# Patient Record
Sex: MALE | Race: WHITE | Employment: FULL TIME | URBAN - METROPOLITAN AREA
[De-identification: names, ages, dates, MRNs, and addresses within clinical notes are randomized per-mention and may not be internally consistent; named-entity substitution may affect disease eponyms.]

---

## 2019-01-30 ENCOUNTER — HISTORICAL (OUTPATIENT)
Dept: RADIOLOGY | Facility: HOSPITAL | Age: 29
End: 2019-01-30

## 2019-01-30 LAB
ALBUMIN SERPL-MCNC: 4.7 GM/DL (ref 3.4–5)
ALBUMIN/GLOB SERPL: 1.3 RATIO (ref 1.1–2)
ALP SERPL-CCNC: 71 UNIT/L (ref 45–117)
ALT SERPL-CCNC: 14 UNIT/L (ref 12–78)
AST SERPL-CCNC: 22 UNIT/L (ref 15–37)
BILIRUB SERPL-MCNC: 0.4 MG/DL (ref 0.2–1)
BILIRUBIN DIRECT+TOT PNL SERPL-MCNC: 0.1 MG/DL
BILIRUBIN DIRECT+TOT PNL SERPL-MCNC: 0.3 MG/DL
BUN SERPL-MCNC: 14 MG/DL (ref 7–18)
CALCIUM SERPL-MCNC: 9.2 MG/DL (ref 8.5–10.1)
CHLORIDE SERPL-SCNC: 105 MMOL/L (ref 98–107)
CHOLEST SERPL-MCNC: 167 MG/DL
CHOLEST/HDLC SERPL: 3.2 {RATIO} (ref 0–5)
CO2 SERPL-SCNC: 29 MMOL/L (ref 21–32)
CORTIS SERPL-SCNC: 15 MCG/DL
CREAT SERPL-MCNC: 1.1 MG/DL (ref 0.6–1.3)
GLOBULIN SER-MCNC: 3.7 GM/ML (ref 2.3–3.5)
GLUCOSE SERPL-MCNC: 99 MG/DL (ref 74–106)
HDLC SERPL-MCNC: 52 MG/DL
LDLC SERPL CALC-MCNC: 101 MG/DL (ref 0–130)
POTASSIUM SERPL-SCNC: 4.2 MMOL/L (ref 3.5–5.1)
PROT SERPL-MCNC: 8.4 GM/DL (ref 6.4–8.2)
SODIUM SERPL-SCNC: 137 MMOL/L (ref 136–145)
TRIGL SERPL-MCNC: 72 MG/DL
TSH SERPL-ACNC: 1.12 MIU/L (ref 0.36–3.74)
VLDLC SERPL CALC-MCNC: 14 MG/DL

## 2019-03-20 ENCOUNTER — HISTORICAL (OUTPATIENT)
Dept: ADMINISTRATIVE | Facility: HOSPITAL | Age: 29
End: 2019-03-20

## 2019-03-20 LAB
T4 FREE SERPL-MCNC: 0.87 NG/DL (ref 0.76–1.46)
TSH SERPL-ACNC: 1.03 MIU/L (ref 0.36–3.74)

## 2019-03-22 ENCOUNTER — HISTORICAL (OUTPATIENT)
Dept: FAMILY MEDICINE | Facility: CLINIC | Age: 29
End: 2019-03-22

## 2019-04-02 ENCOUNTER — HISTORICAL (OUTPATIENT)
Dept: RADIOLOGY | Facility: HOSPITAL | Age: 29
End: 2019-04-02

## 2020-06-10 ENCOUNTER — HISTORICAL (OUTPATIENT)
Dept: ADMINISTRATIVE | Facility: HOSPITAL | Age: 30
End: 2020-06-10

## 2020-06-10 LAB
ABS NEUT (OLG): 7.76 X10(3)/MCL (ref 2.1–9.2)
APPEARANCE, UA: NORMAL
BACTERIA #/AREA URNS AUTO: ABNORMAL /HPF
BASOPHILS # BLD AUTO: 0.1 X10(3)/MCL (ref 0–0.2)
BASOPHILS NFR BLD AUTO: 1 %
BILIRUB UR QL STRIP: NEGATIVE
COLOR UR: YELLOW
EOSINOPHIL # BLD AUTO: 0.5 X10(3)/MCL (ref 0–0.9)
EOSINOPHIL NFR BLD AUTO: 4 %
ERYTHROCYTE [DISTWIDTH] IN BLOOD BY AUTOMATED COUNT: 13.1 % (ref 11.5–14.5)
GLUCOSE (UA): NEGATIVE
HCT VFR BLD AUTO: 46.5 % (ref 40–51)
HGB BLD-MCNC: 14.8 GM/DL (ref 13.5–17.5)
HGB UR QL STRIP: NEGATIVE
HIV 1+2 AB+HIV1 P24 AG SERPL QL IA: NONREACTIVE
HYALINE CASTS #/AREA URNS LPF: ABNORMAL /LPF
IMM GRANULOCYTES # BLD AUTO: 0.03 10*3/UL
IMM GRANULOCYTES NFR BLD AUTO: 0 %
KETONES UR QL STRIP: NEGATIVE
LEUKOCYTE ESTERASE UR QL STRIP: NEGATIVE
LYMPHOCYTES # BLD AUTO: 2.4 X10(3)/MCL (ref 0.6–4.6)
LYMPHOCYTES NFR BLD AUTO: 20 %
MCH RBC QN AUTO: 29.7 PG (ref 26–34)
MCHC RBC AUTO-ENTMCNC: 31.8 GM/DL (ref 31–37)
MCV RBC AUTO: 93.4 FL (ref 80–100)
MONOCYTES # BLD AUTO: 1.1 X10(3)/MCL (ref 0.1–1.3)
MONOCYTES NFR BLD AUTO: 9 %
NEUTROPHILS # BLD AUTO: 7.76 X10(3)/MCL (ref 2.1–9.2)
NEUTROPHILS NFR BLD AUTO: 65 %
NITRITE UR QL STRIP: NEGATIVE
PH UR STRIP: 7 [PH] (ref 4.5–8)
PLATELET # BLD AUTO: 298 X10(3)/MCL (ref 130–400)
PMV BLD AUTO: 10.2 FL (ref 7.4–10.4)
PROT UR QL STRIP: NEGATIVE
RBC # BLD AUTO: 4.98 X10(6)/MCL (ref 4.5–5.9)
RBC #/AREA URNS AUTO: ABNORMAL /HPF
SP GR UR STRIP: 1.01 (ref 1–1.03)
SQUAMOUS #/AREA URNS LPF: ABNORMAL /LPF
T4 FREE SERPL-MCNC: 0.99 NG/DL (ref 0.76–1.46)
TSH SERPL-ACNC: 1.7 MIU/L (ref 0.36–3.74)
UROBILINOGEN UR STRIP-ACNC: NORMAL
WBC # SPEC AUTO: 11.9 X10(3)/MCL (ref 4.5–11)
WBC #/AREA URNS AUTO: ABNORMAL /HPF

## 2021-08-05 ENCOUNTER — HISTORICAL (OUTPATIENT)
Dept: ADMINISTRATIVE | Facility: HOSPITAL | Age: 31
End: 2021-08-05

## 2021-08-05 LAB
ABS NEUT (OLG): 3.32 X10(3)/MCL (ref 2.1–9.2)
ALBUMIN SERPL-MCNC: 4.7 GM/DL (ref 3.5–5)
ALBUMIN/GLOB SERPL: 1.4 RATIO (ref 1.1–2)
ALP SERPL-CCNC: 76 UNIT/L (ref 40–150)
ALT SERPL-CCNC: 12 UNIT/L (ref 0–55)
AST SERPL-CCNC: 17 UNIT/L (ref 5–34)
BASOPHILS # BLD AUTO: 0.1 X10(3)/MCL (ref 0–0.2)
BASOPHILS NFR BLD AUTO: 1 %
BILIRUB SERPL-MCNC: 0.4 MG/DL
BILIRUBIN DIRECT+TOT PNL SERPL-MCNC: 0.2 MG/DL (ref 0–0.5)
BILIRUBIN DIRECT+TOT PNL SERPL-MCNC: 0.2 MG/DL (ref 0–0.8)
BUN SERPL-MCNC: 14.3 MG/DL (ref 8.9–20.6)
CALCIUM SERPL-MCNC: 10.3 MG/DL (ref 8.4–10.2)
CHLORIDE SERPL-SCNC: 103 MMOL/L (ref 98–107)
CO2 SERPL-SCNC: 32 MMOL/L (ref 22–29)
CREAT SERPL-MCNC: 0.97 MG/DL (ref 0.73–1.18)
EOSINOPHIL # BLD AUTO: 0.2 X10(3)/MCL (ref 0–0.9)
EOSINOPHIL NFR BLD AUTO: 3 %
ERYTHROCYTE [DISTWIDTH] IN BLOOD BY AUTOMATED COUNT: 12.7 % (ref 11.5–14.5)
EST CREAT CLEARANCE SER (OHS): 119.19 ML/MIN
GLOBULIN SER-MCNC: 3.3 GM/DL (ref 2.4–3.5)
GLUCOSE SERPL-MCNC: 96 MG/DL (ref 74–100)
HCT VFR BLD AUTO: 47.1 % (ref 40–51)
HGB BLD-MCNC: 15.2 GM/DL (ref 13.5–17.5)
HIV 1+2 AB+HIV1 P24 AG SERPL QL IA: NONREACTIVE
IMM GRANULOCYTES # BLD AUTO: 0.02 10*3/UL
IMM GRANULOCYTES NFR BLD AUTO: 0 %
LYMPHOCYTES # BLD AUTO: 1.9 X10(3)/MCL (ref 0.6–4.6)
LYMPHOCYTES NFR BLD AUTO: 30 %
MCH RBC QN AUTO: 29.7 PG (ref 26–34)
MCHC RBC AUTO-ENTMCNC: 32.3 GM/DL (ref 31–37)
MCV RBC AUTO: 92.2 FL (ref 80–100)
MONOCYTES # BLD AUTO: 0.8 X10(3)/MCL (ref 0.1–1.3)
MONOCYTES NFR BLD AUTO: 13 %
NEUTROPHILS # BLD AUTO: 3.32 X10(3)/MCL (ref 2.1–9.2)
NEUTROPHILS NFR BLD AUTO: 53 %
NRBC BLD AUTO-RTO: 0 % (ref 0–0.2)
PLATELET # BLD AUTO: 285 X10(3)/MCL (ref 130–400)
PMV BLD AUTO: 10.4 FL (ref 7.4–10.4)
POTASSIUM SERPL-SCNC: 4.2 MMOL/L (ref 3.5–5.1)
PROT SERPL-MCNC: 8 GM/DL (ref 6.4–8.3)
RBC # BLD AUTO: 5.11 X10(6)/MCL (ref 4.5–5.9)
SODIUM SERPL-SCNC: 141 MMOL/L (ref 136–145)
T PALLIDUM AB SER QL: NONREACTIVE
WBC # SPEC AUTO: 6.3 X10(3)/MCL (ref 4.5–11)

## 2022-04-10 ENCOUNTER — HISTORICAL (OUTPATIENT)
Dept: ADMINISTRATIVE | Facility: HOSPITAL | Age: 32
End: 2022-04-10
Payer: MEDICAID

## 2022-04-27 VITALS
HEIGHT: 71 IN | SYSTOLIC BLOOD PRESSURE: 133 MMHG | OXYGEN SATURATION: 100 % | BODY MASS INDEX: 17.38 KG/M2 | WEIGHT: 124.13 LBS | DIASTOLIC BLOOD PRESSURE: 82 MMHG
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
## 2022-05-03 NOTE — HISTORICAL [facility]
This is a historical note converted from Yvette. Formatting and pictures may have been removed.  Please reference Yvette for original formatting and attached multimedia. Chief Complaint  follow-up / warts on hands x 5-6 months  History of Present Illness  29 yo M with h/o HTN and?previous?kidney stone (2015) presents to the clinic for routine follow up. Today, he complains of warts on both hands.? Patient states he had warts when he was about 12 years old?and had them removed with cryo.? This current flare started approximately 3-4 months ago and has worsened. ?Warts are present in both hands and have not responded to home remedies.? They are not painful?and he denies warts anywhere else.? He is also concerned about an erythematous patch on the palm of the right hand?which he suspected was a blister?is now unsure.  ?   HTN is well controlled?on amlodipine and metoprolol. ?He reports compliance and no side effects.? He continues to have rare?R flank pain. ?Previous renal artery ultrasound incidentally noted a retained?renal stone.  Review of Systems  11 point ROS reviewed and negative except as noted above  Physical Exam  Vitals & Measurements  T:?36.7? ?C (Oral)? HR:?75(Peripheral)? RR:?20? BP:?138/74?  HT:?180.34?cm? WT:?58.6?kg? BMI:?18.02?  General:?AAO x 3, no acute distress, well appearing  Eye: PERRLA, EOMI, clear conjunctiva, eyelids normal  HENT:?oropharynx without erythema/exudate, oropharynx and nasal mucosal surfaces moist  Respiratory:?clear to auscultation bilaterally, normal respiratory?rate and inspiratory effort  Cardiovascular:?RRR w/o murmurs, gallops or rubs, normal peripheral pulses, no LE edema  Integumentary: at least 10 small warts noted in even distribution of both hands predominantly on pads of fingers but also knuckles, no central dimple, no erythema, there is a 1.2 cm raised/thickened erythematous patch on the palm of the R hand  Neurologic: moves all extremities spontaneously, speech is  clear, cognition in tact  Assessment/Plan  B/L Hand Warts  Palm lesion  -made referral to Derm clinic, but given long wait times recommended OTC wart treatments including salicylic acid or home cryo kits  -checking HIV and immunoglobulin levels given abnormal eruption of warts in adult  -For the lesion on the palm, recommended OTC antifungal lotion for?1 korin, if not improved?then 2 wk trial of hydrocortisone cream  ?   HTN  -Reviewed renal artery US, normal. Reviewed?metanephrines, cortisol, etc.?- all WNL  -Renin/aldosterone ordered  -BP well-controlled today  -Counseled on low-sodium diet  -Continue amlodipine 5 mg metoprolol succinate 50 mg daily  ?   Anxiety  -improved after getting normal results of previous workups  -tremor improved w/metoprolol  -no further treatment indicated  ?   h/o kidney stone  -Again counseled on proper hydration, proper diet,?and symptoms to look out for  -Discussed treatment options, referral to Urology, etc. Pt prefers to monitor at this time. Will continue with checking renal fx q 6 months and UA for hematuria  ?   Tobacco use  -Counseled extensively on cessation  ?   Health?maintenance  -Flu vaccine given today  ?  ?   Return to clinic in 6 months   Problem List/Past Medical History  Ongoing  Essential hypertension  Historical  No qualifying data  Procedure/Surgical History  hernia repair   Medications  amlodipine 5 mg oral tablet, 5 mg= 1 tab(s), Oral, Daily, 2 refills  metoprolol succinate 50 mg oral capsule, extended release, 50 mg= 1 cap(s), Oral, Daily, 2 refills  Allergies  No Known Allergies  No Known Medication Allergies  Social History  Abuse/Neglect  No, 01/27/2020  No, No, Yes, 12/01/2019  Alcohol - Medium Risk, 01/19/2015  Past, Wine, 1-2 times per week, Alcohol use interferes with work or home: No. Drinks more than intended: Yes. Others hurt by drinking: No. Ready to change: No. Household alcohol concerns: No., 03/20/2019  Home/Environment  Lives with Significant  other. Living situation: Home/Independent. Home equipment: Monitoring. Alcohol abuse in household: No. Substance abuse in household: No. Smoker in household: Yes. Feels unsafe at home: No. Safe place to go: Yes. Family/Friends available for support: Yes. Concern for family members at home: No. Major illness in household: No. Financial concerns: No., 03/20/2019  Nutrition/Health  Regular, Caffeine intake amount: 1 cup coffee daily. Wants to lose weight: No. Sleeping concerns: No. Feels highly stressed: No., 03/20/2019  Sexual  Gender Identity Identifies as male., 02/21/2019  Substance Use - Low Risk, 02/05/2016  Current, Marijuana, 1-2 times per week, 12/11/2018  Tobacco - Medium Risk, 01/19/2015  10 or more cigarettes (1/2 pack or more)/day in last 30 days, No, 01/27/2020  5-9 cigarettes (between 1/4 to 1/2 pack)/day in last 30 days, No, 12/01/2019  10 or more cigarettes (1/2 pack or more)/day in last 30 days, Cigarettes, No, 09/30/2019  Family History  Hypertension.: Mother and Father.  Immunizations  Vaccine Date Status   influenza virus vaccine, inactivated 09/30/2019 Given   Health Maintenance  Health Maintenance  ???Pending?(in the next year)  ??? ??OverDue  ??? ? ? ?Alcohol Misuse Screening due??01/01/20??and every 1??year(s)  ??? ? ? ?Smoking Cessation due??01/01/20??and every 1??year(s)  ??? ??Due?  ??? ? ? ?Hypertension Management-Education due??06/10/20??and every 1??year(s)  ??? ? ? ?Tetanus Vaccine due??06/10/20??and every 10??year(s)  ??? ??Due In Future?  ??? ? ? ?Blood Pressure Screening not due until??09/29/20??and every 1??year(s)  ??? ? ? ?Depression Screening not due until??09/29/20??and every 1??year(s)  ??? ? ? ?Hypertension Management-Blood Pressure not due until??09/29/20??and every 1??year(s)  ??? ? ? ?ADL Screening not due until??09/30/20??and every 1??year(s)  ??? ? ? ?Hypertension Management-BMP not due until??11/30/20??and every 1??year(s)  ??? ? ? ?Obesity Screening not due  until??01/01/21??and every 1??year(s)  ??? ? ? ?Body Mass Index Check not due until??01/26/21??and every 1??year(s)  ???Satisfied?(in the past 1 year)  ??? ??Satisfied?  ??? ? ? ?ADL Screening on??09/30/19.??Satisfied by Analilia Ramirez LPN  ??? ? ? ?Blood Pressure Screening on??01/27/20.??Satisfied by Shiva Linares RN  ??? ? ? ?Body Mass Index Check on??01/27/20.??Satisfied by Shiva Linares RN  ??? ? ? ?Depression Screening on??09/30/19.??Satisfied by Analilia Ramirez LPN  ??? ? ? ?Hypertension Management-Blood Pressure on??01/27/20.??Satisfied by Shiva Linares RN  ??? ? ? ?Influenza Vaccine on??09/30/19.??Satisfied by Analilia Ramirez LPN  ??? ? ? ?Obesity Screening on??01/27/20.??Satisfied by Shiva Linares RN  ?      Patient seen and examined,?reviewed with the resident,?agree with?assessment?and?plan. spoke to outside pa with dermatology. recs trying antifungal cream for 2-4 weeks on hand lesion and then corticosteroid if not better vs bs/scapint.? as for warts, standart therapy vs cryotherapy.? will also send off renin and renein / aldosterone levels

## 2022-05-03 NOTE — HISTORICAL [facility]
This is a historical note converted from Yvette. Formatting and pictures may have been removed.  Please reference Yvette for original formatting and attached multimedia. Chief Complaint  follow up with medicaiton refill, possible STD  History of Present Illness  Mr. Garza is a pleasant 31-year-old male with PMH of?hypertension, anxiety,?and Hx of?kidney stone?(2015)?who presents to the clinic for routine follow-up?and with complaint of?new onset genital warts.? Patient states that the warts on both of his hands?have completely gone?with the use of his OTC?Compound W.? Patient states his?ex-girlfriend recently tested positive for HPV.? Patient states he had?no symptoms?however?noticed?multiple warts pop up over the course of the last year. ?Patient states there are 3 nonpainful, nonpruritic, nonindurated warts?at the base of the shaft of the penis.? Patient denies any dysuria, hematuria, polyuria, fevers, chills. ?Patient admits to using condoms?with his previous partners.? Patient has no other complaints at this time.  Review of Systems  An 11 point Review of System was performed and is negative unless documented above.?  Physical Exam  Vitals & Measurements  T:?36.8? ?C (Oral)? HR:?66(Peripheral)? RR:?18? BP:?133/82?  HT:?180.00?cm? WT:?56.300?kg? BMI:?17.38?  General:?Patient sitting comfortably?in chair,?in no acute distress  Eye:?EOMI, PERRL,?clear sclera  HENT:?Normocephalic, atraumatic, grossly normal hearing, moist mucous membranes  Neck:?Trachea midline, no gross thyromegaly  Respiratory:?Clear to auscultation bilaterally, no?rhonchi, no rales, no wheezes  Cardiovascular:?RRR,?no gallops, no murmurs, no rubs  Gastrointestinal:?Abdomen soft, nontender to palpation, no gross organomegaly  Genitourinary: Grossly normal appearing?penis,?no discharge,?circumcised,?no testes enlargement,?3?warts noted at base of shaft, nonindurated,?no surrounding erythema, nonpainful  Lymphatics:?No extremity edema, pulses 2+  in all 4 extremities  Integumentary:?No warts noted on hands,?appears to have resolved,?warts noted?as above  Neurologic:?Moves all extremities spontaneously, speech is clear,?normal affect  Assessment/Plan  1. ?Genital warts  -Syphilis?(-),?HIV (-)  -Pending gonorrhea,?chlamydia  -Currently no symptoms?likely HPV, will monitor  ?  2.? HTN  -Normal?renal artery ultrasound, metanephrines, cortisol, renin/aldosterone ratio  -BP is well controlled today, educated on DASH diet  -Continue?amlodipine 5 mg?and metoprolol succinate 50 mg daily  ?  3.? Anxiety  -Improved as well as tremor?with metoprolol use  -Patient considering?behavioral health?consult  ?  4.? Hx of?bilateral hand warts  -Warts on hands have resolved?with use of?Compound W?(OTC)  -Holding dermatology consult for now, consider if?lesions return  ?  5.? Hx of kidney stone  -Counseled on proper hydration, proper diet, patient aware of symptoms to look out for  -Holding urology consult right now, consider if symptoms return, patient monitoring at home currently  ?  6.? Tobacco use  -Educated extensively on cessation, no interest in quitting at this time  -Currently at 1 pack/day  ?  Health Maintenance  Vaccinations  -Flu:?Declined  -Covid:?Declined  -Tdap:?Declined  Screening  -Colon Ca. Screening:?Family history with grandmother diagnosed at age 70, begin?screening at age 42  ?   Follow-up in ~6 months  ?   Attending Physician Addendum  Management and plan discussed with resident?at time of clinic visit. Care was reasonable and necessary. Routine follow up.  Referrals  Clinic Follow up, *Est. 02/05/22 3:00:00 CST, Order for future visit, Genital warts  Anxiety  Essential hypertension, [facility]   Problem List/Past Medical History  Ongoing  Essential hypertension  Kidney stone on left side  Historical  No qualifying data  Procedure/Surgical History  hernia repair   Medications  amlodipine 5 mg oral tablet, 5 mg= 1 tab(s), Oral, Daily, 2  refills  metoprolol succinate 50 mg oral capsule, extended release, 50 mg= 1 cap(s), Oral, Daily, 2 refills  Allergies  No Known Allergies  No Known Medication Allergies  Social History  Abuse/Neglect  No, No, Yes, 06/10/2020  Alcohol - Medium Risk, 01/19/2015  Past, Wine, 1-2 times per week, Alcohol use interferes with work or home: No. Drinks more than intended: Yes. Others hurt by drinking: No. Ready to change: No. Household alcohol concerns: No., 03/20/2019  Home/Environment  Lives with Significant other. Living situation: Home/Independent. Home equipment: Monitoring. Alcohol abuse in household: No. Substance abuse in household: No. Smoker in household: Yes. Feels unsafe at home: No. Safe place to go: Yes. Family/Friends available for support: Yes. Concern for family members at home: No. Major illness in household: No. Financial concerns: No., 03/20/2019  Nutrition/Health  Regular, Caffeine intake amount: 1 cup coffee daily. Wants to lose weight: No. Sleeping concerns: No. Feels highly stressed: No., 03/20/2019  Sexual  Gender Identity Identifies as male., 02/21/2019  Substance Use - Low Risk, 02/05/2016  Current, Marijuana, 1-2 times per week, 12/11/2018  Tobacco - Medium Risk, 01/19/2015  10 or more cigarettes (1/2 pack or more)/day in last 30 days, Cigarettes, No, 08/05/2021  Family History  Hypertension.: Mother and Father.  Immunizations  Vaccine Date Status   influenza virus vaccine, inactivated 09/30/2019 Given   Health Maintenance  Health Maintenance  ???Pending?(in the next year)  ??? ??Refused?  ??? ? ? ?Tetanus Vaccine due??08/05/21??and every 10??year(s)  ??? ??Due In Future?  ??? ? ? ?Depression Screening not due until??09/30/21??and every 1??year(s)  ??? ? ? ?Obesity Screening not due until??01/01/22??and every 1??year(s)  ??? ? ? ?Smoking Cessation not due until??01/01/22??and every 1??year(s)  ??? ? ? ?Alcohol Misuse Screening not due until??01/02/22??and every 1??year(s)  ???Satisfied?(in the  past 1 year)  ??? ??Satisfied?  ??? ? ? ?ADL Screening on??08/05/21.??Satisfied by Arpit Hirsch LPN  ??? ? ? ?Alcohol Misuse Screening on??08/05/21.??Satisfied by Kary Akins DO  ??? ? ? ?Blood Pressure Screening on??08/05/21.??Satisfied by Arpit Hirsch LPN  ??? ? ? ?Body Mass Index Check on??08/05/21.??Satisfied by Arpit Hirsch LPN  ??? ? ? ?Depression Screening on??09/30/20.??Satisfied by Verona Patel  ??? ? ? ?Hypertension Management-BMP on??08/05/21.??Satisfied by James Love Jr.  ??? ? ? ?Obesity Screening on??08/05/21.??Satisfied by Arpit Hirsch LPN  ??? ? ? ?Smoking Cessation on??08/05/21.??Satisfied by Kary Akins DO  ??? ??Refused?  ??? ? ? ?Influenza Vaccine on??08/05/21.??Recorded by Kary Akins DO  ??? ? ? ?Tetanus Vaccine on??08/05/21.??Recorded by Kary Akins DO  ?  Lab Results  Test Name Test Result Date/Time   Sodium Lvl 141 mmol/L 08/05/2021 09:41 CDT   Potassium Lvl 4.2 mmol/L 08/05/2021 09:41 CDT   Chloride 103 mmol/L 08/05/2021 09:41 CDT   CO2 32 mmol/L (High) 08/05/2021 09:41 CDT   Calcium Lvl 10.3 mg/dL (High) 08/05/2021 09:41 CDT   Glucose Lvl 96 mg/dL 08/05/2021 09:41 CDT   BUN 14.3 mg/dL 08/05/2021 09:41 CDT   Creatinine 0.97 mg/dL 08/05/2021 09:41 CDT   eGFR-AA >105 08/05/2021 09:41 CDT   eGFR-ANDRZEJ 97 mL/min/1.73 m2 08/05/2021 09:41 CDT   Bili Total 0.4 mg/dL 08/05/2021 09:41 CDT   Bili Direct 0.2 mg/dL 08/05/2021 09:41 CDT   Bili Indirect 0.20 mg/dL 08/05/2021 09:41 CDT   AST 17 unit/L 08/05/2021 09:41 CDT   ALT 12 unit/L 08/05/2021 09:41 CDT   Alk Phos 76 unit/L 08/05/2021 09:41 CDT   Total Protein 8.0 gm/dL 08/05/2021 09:41 CDT   Albumin Lvl 4.7 gm/dL 08/05/2021 09:41 CDT   Globulin 3.3 gm/dL 08/05/2021 09:41 CDT   A/G Ratio 1.4 ratio 08/05/2021 09:41 CDT   WBC 6.3 x10(3)/mcL 08/05/2021 09:41 CDT   RBC 5.11 x10(6)/mcL 08/05/2021 09:41 CDT   Hgb 15.2 gm/dL 08/05/2021 09:41 CDT   Hct 47.1 % 08/05/2021 09:41 CDT   Platelet 285  x10(3)/mcL 08/05/2021 09:41 CDT   MCV 92.2 fL 08/05/2021 09:41 CDT   MCH 29.7 pg 08/05/2021 09:41 CDT   MCHC 32.3 gm/dL 08/05/2021 09:41 CDT   RDW 12.7 % 08/05/2021 09:41 CDT   MPV 10.4 fL 08/05/2021 09:41 CDT   Abs Neut 3.32 x10(3)/mcL 08/05/2021 09:41 CDT   Neutro Auto 53 % 08/05/2021 09:41 CDT   Lymph Auto 30 % 08/05/2021 09:41 CDT   Mono Auto 13 % 08/05/2021 09:41 CDT   Eos Auto 3 % 08/05/2021 09:41 CDT   Abs Eos 0.2 x10(3)/mcL 08/05/2021 09:41 CDT   Basophil Auto 1 % 08/05/2021 09:41 CDT   Abs Neutro 3.32 x10(3)/mcL 08/05/2021 09:41 CDT   Abs Lymph 1.9 x10(3)/mcL 08/05/2021 09:41 CDT   Abs Mono 0.8 x10(3)/[facility] 08/05/2021 09:41 CDT   Abs Baso 0.1 x10(3)/[facility] 08/05/2021 09:41 CDT   NRBC% 0.0 % 08/05/2021 09:41 CDT   IG% 0 % 08/05/2021 09:41 CDT   IG# 0.020 08/05/2021 09:41 CDT   Syphilis Ab Nonreactive 08/05/2021 09:41 CDT   HIV Nonreactive 08/05/2021 09:41 CDT

## 2022-07-16 ENCOUNTER — HOSPITAL ENCOUNTER (EMERGENCY)
Facility: HOSPITAL | Age: 32
Discharge: HOME | End: 2022-07-16
Attending: FAMILY MEDICINE
Payer: MEDICAID

## 2022-07-16 VITALS
OXYGEN SATURATION: 100 % | RESPIRATION RATE: 15 BRPM | DIASTOLIC BLOOD PRESSURE: 74 MMHG | HEIGHT: 71 IN | SYSTOLIC BLOOD PRESSURE: 113 MMHG | BODY MASS INDEX: 17.34 KG/M2 | TEMPERATURE: 99 F | HEART RATE: 55 BPM | WEIGHT: 123.88 LBS

## 2022-07-16 DIAGNOSIS — U07.1 COVID: Primary | ICD-10-CM

## 2022-07-16 LAB
ALBUMIN SERPL-MCNC: 4.5 GM/DL (ref 3.5–5)
ALBUMIN/GLOB SERPL: 1.4 RATIO (ref 1.1–2)
ALP SERPL-CCNC: 72 UNIT/L (ref 40–150)
ALT SERPL-CCNC: 38 UNIT/L (ref 0–55)
AST SERPL-CCNC: 105 UNIT/L (ref 5–34)
BASOPHILS # BLD AUTO: 0.02 X10(3)/MCL (ref 0–0.2)
BASOPHILS NFR BLD AUTO: 0.4 %
BILIRUBIN DIRECT+TOT PNL SERPL-MCNC: 0.4 MG/DL
BUN SERPL-MCNC: 11.3 MG/DL (ref 8.9–20.6)
CALCIUM SERPL-MCNC: 9.8 MG/DL (ref 8.4–10.2)
CHLORIDE SERPL-SCNC: 103 MMOL/L (ref 98–107)
CO2 SERPL-SCNC: 29 MMOL/L (ref 22–29)
CREAT SERPL-MCNC: 1.15 MG/DL (ref 0.73–1.18)
EOSINOPHIL # BLD AUTO: 0.04 X10(3)/MCL (ref 0–0.9)
EOSINOPHIL NFR BLD AUTO: 0.8 %
ERYTHROCYTE [DISTWIDTH] IN BLOOD BY AUTOMATED COUNT: 12.6 % (ref 11.5–17)
FLUAV AG UPPER RESP QL IA.RAPID: NOT DETECTED
FLUBV AG UPPER RESP QL IA.RAPID: NOT DETECTED
GLOBULIN SER-MCNC: 3.3 GM/DL (ref 2.4–3.5)
GLUCOSE SERPL-MCNC: 87 MG/DL (ref 74–100)
HCT VFR BLD AUTO: 48.6 % (ref 42–52)
HGB BLD-MCNC: 15.7 GM/DL (ref 14–18)
IMM GRANULOCYTES # BLD AUTO: 0.02 X10(3)/MCL (ref 0–0.04)
IMM GRANULOCYTES NFR BLD AUTO: 0.4 %
LYMPHOCYTES # BLD AUTO: 2.1 X10(3)/MCL (ref 0.6–4.6)
LYMPHOCYTES NFR BLD AUTO: 42.4 %
MCH RBC QN AUTO: 29.3 PG (ref 27–31)
MCHC RBC AUTO-ENTMCNC: 32.3 MG/DL (ref 33–36)
MCV RBC AUTO: 90.7 FL (ref 80–94)
MONOCYTES # BLD AUTO: 0.75 X10(3)/MCL (ref 0.1–1.3)
MONOCYTES NFR BLD AUTO: 15.2 %
NEUTROPHILS # BLD AUTO: 2 X10(3)/MCL (ref 2.1–9.2)
NEUTROPHILS NFR BLD AUTO: 40.8 %
NRBC BLD AUTO-RTO: 0 %
PLATELET # BLD AUTO: 155 X10(3)/MCL (ref 130–400)
PMV BLD AUTO: 11.3 FL (ref 7.4–10.4)
POTASSIUM SERPL-SCNC: 4.3 MMOL/L (ref 3.5–5.1)
PROT SERPL-MCNC: 7.8 GM/DL (ref 6.4–8.3)
RBC # BLD AUTO: 5.36 X10(6)/MCL (ref 4.7–6.1)
RSV A 5' UTR RNA NPH QL NAA+PROBE: NOT DETECTED
SARS-COV-2 RNA RESP QL NAA+PROBE: DETECTED
SODIUM SERPL-SCNC: 142 MMOL/L (ref 136–145)
STREP A PCR (OHS): NOT DETECTED
WBC # SPEC AUTO: 5 X10(3)/MCL (ref 4.5–11.5)

## 2022-07-16 PROCEDURE — 87636 SARSCOV2 & INF A&B AMP PRB: CPT | Performed by: PHYSICIAN ASSISTANT

## 2022-07-16 PROCEDURE — 80053 COMPREHEN METABOLIC PANEL: CPT | Performed by: PHYSICIAN ASSISTANT

## 2022-07-16 PROCEDURE — 87631 RESP VIRUS 3-5 TARGETS: CPT | Performed by: PHYSICIAN ASSISTANT

## 2022-07-16 PROCEDURE — 96372 THER/PROPH/DIAG INJ SC/IM: CPT | Performed by: PHYSICIAN ASSISTANT

## 2022-07-16 PROCEDURE — 63600175 PHARM REV CODE 636 W HCPCS: Performed by: PHYSICIAN ASSISTANT

## 2022-07-16 PROCEDURE — 36415 COLL VENOUS BLD VENIPUNCTURE: CPT | Performed by: PHYSICIAN ASSISTANT

## 2022-07-16 PROCEDURE — 99284 EMERGENCY DEPT VISIT MOD MDM: CPT | Mod: 25

## 2022-07-16 PROCEDURE — 85025 COMPLETE CBC W/AUTO DIFF WBC: CPT | Performed by: PHYSICIAN ASSISTANT

## 2022-07-16 RX ORDER — METOPROLOL SUCCINATE 50 MG/1
TABLET, EXTENDED RELEASE ORAL DAILY
COMMUNITY
Start: 2022-06-03 | End: 2022-09-08 | Stop reason: SDUPTHER

## 2022-07-16 RX ORDER — AMLODIPINE BESYLATE 5 MG/1
5 TABLET ORAL DAILY
COMMUNITY
Start: 2022-06-02 | End: 2022-09-06 | Stop reason: SDUPTHER

## 2022-07-16 RX ORDER — KETOROLAC TROMETHAMINE 30 MG/ML
30 INJECTION, SOLUTION INTRAMUSCULAR; INTRAVENOUS
Status: COMPLETED | OUTPATIENT
Start: 2022-07-16 | End: 2022-07-16

## 2022-07-16 RX ADMIN — KETOROLAC TROMETHAMINE 30 MG: 30 INJECTION, SOLUTION INTRAMUSCULAR; INTRAVENOUS at 08:07

## 2022-07-16 NOTE — Clinical Note
"Lazaro"Miguelangel Garza was seen and treated in our emergency department on 7/16/2022.     COVID-19 is present in our communities across the state. There is limited testing for COVID at this time, so not all patients can be tested. In this situation, your employee meets the following criteria:    Lazaro Garza has met the criteria for COVID-19 testing and has a POSITIVE result. He can return to work once they are asymptomatic for 24 hours without the use of fever reducing medications AND at least five days from the first positive result. A mask is recommended for 5 days post quarantine.     If you have any questions or concerns, or if I can be of further assistance, please do not hesitate to contact me.    Sincerely,             Jarrell Edwards MD"

## 2022-07-17 NOTE — ED PROVIDER NOTES
"Encounter Date: 7/16/2022       History     Chief Complaint   Patient presents with    Neck Pain     Pt reports waking up on 07/12 profusely sweating with a sharp pain to lower left neck radiating up into his head, pt reports subjective fever for 2 days that has resolved but the neck pain persists. Reports "pressure" behind both eyes when he moves his eyes and generalized muscle soreness. Vss.     Muscle Pain     Patient is a 32 year old male who presents to ED with sharp bi neck pain that radiates up to base of skull, bi mid back pain, fever (yesterday), headache behind eyes and generalized body aches x 2 days.  He states he took Ibuprofen yesterday with little relief.  He denies fever today, SOB, chest pain, nausea, vomiting, cough, diarrhea, dysuria, abdominal pain.      The history is provided by the patient. No  was used.     Review of patient's allergies indicates:  No Known Allergies  Past Medical History:   Diagnosis Date    Hypertension      History reviewed. No pertinent surgical history.  History reviewed. No pertinent family history.  Social History     Tobacco Use    Smoking status: Current Every Day Smoker     Packs/day: 1.00     Types: Cigarettes    Smokeless tobacco: Never Used     Review of Systems   Constitutional: Positive for chills and fever.   HENT: Negative for congestion, ear pain and sore throat.    Respiratory: Negative for cough and shortness of breath.    Cardiovascular: Negative for chest pain and palpitations.   Gastrointestinal: Negative for abdominal pain, diarrhea, nausea and vomiting.   Genitourinary: Negative for dysuria.   Musculoskeletal: Positive for back pain and myalgias.   Skin: Negative.    Neurological: Positive for headaches.   Hematological: Negative.        Physical Exam     Initial Vitals [07/16/22 1951]   BP Pulse Resp Temp SpO2   (!) 144/78 (!) 52 19 98.6 °F (37 °C) 100 %      MAP       --         Physical Exam    Nursing note and vitals " reviewed.  Constitutional: He appears well-developed and well-nourished.   HENT:   Head: Normocephalic and atraumatic.   Nose: Nose normal.   Mouth/Throat: Oropharynx is clear and moist.   Eyes: Conjunctivae are normal. Pupils are equal, round, and reactive to light.   Neck: Neck supple.   Normal range of motion.  Cardiovascular: Normal rate, normal heart sounds and intact distal pulses.   Pulmonary/Chest: Breath sounds normal. He has no wheezes.   Abdominal: Abdomen is soft. Bowel sounds are normal. There is no abdominal tenderness.   Musculoskeletal:         General: Normal range of motion.      Cervical back: Normal range of motion and neck supple. Tenderness (with palpation, muscle tightness ) present. No bony tenderness.     Lymphadenopathy:     He has no cervical adenopathy.   Neurological: He is alert.   Skin: Skin is warm.         ED Course   Procedures  Labs Reviewed   COVID/RSV/FLU A&B PCR - Abnormal; Notable for the following components:       Result Value    SARS-CoV-2 PCR Detected (*)     All other components within normal limits   COMPREHENSIVE METABOLIC PANEL - Abnormal; Notable for the following components:    Aspartate Aminotransferase 105 (*)     All other components within normal limits   CBC WITH DIFFERENTIAL - Abnormal; Notable for the following components:    MCHC 32.3 (*)     MPV 11.3 (*)     Neut # 2.0 (*)     All other components within normal limits   STREP GROUP A BY PCR - Normal   CBC W/ AUTO DIFFERENTIAL    Narrative:     The following orders were created for panel order CBC Auto Differential.  Procedure                               Abnormality         Status                     ---------                               -----------         ------                     CBC with Differential[[id number]]        Abnormal            Final result                 Please view results for these tests on the individual orders.   EXTRA TUBES    Narrative:     The following orders were created for panel  order EXTRA TUBES.  Procedure                               Abnormality         Status                     ---------                               -----------         ------                     Light Blue Top Hold[[id number]]                              In process                 Red Top Hold[[id number]]                                     In process                 Pink Top Hold[[id number]]                                    In process                   Please view results for these tests on the individual orders.   LIGHT BLUE TOP HOLD   RED TOP HOLD   PINK TOP HOLD          Imaging Results    None          Medications   ketorolac injection 30 mg (30 mg Intramuscular Given 7/16/22 2059)                 ED Course as of 07/16/22 2212   Sat Jul 16, 2022 2103 I transitioned this patient to RAFITA Velarde at this time.  Lab test still pending [ER]   2105 I, Denisse Giron PA-C, assumed care of patient at this time. Awaiting results  [VJ]   2210 Reassessed patient at this time. He is sitting comfortably in the exam chair. Discussed lab results, diagnosis, and treatment plan with him. He verbalized understanding. Strict return precautions given. Stable for discharge.  [VJ]      ED Course User Index  [ER] RAFITA Rutherford  [VJ] Denisse Giron PA-C             Clinical Impression:   Final diagnoses:  [U07.1] COVID (Primary)          ED Disposition Condition    Discharge Stable        ED Prescriptions     None        Follow-up Information     Follow up With Specialties Details Why Contact Info    [facility] Emergency Dept Emergency Medicine In 3 days As needed, If symptoms worsen [facility] [address] [phone]    [facility]  In 1 week  [address] [id number]           Denisse Giron PA-C  07/16/22 2212

## 2022-07-17 NOTE — DISCHARGE INSTRUCTIONS
Alternate Tylenol and Ibuprofen for fever/body aches. Take Vitamin C and Zinc. Get plenty of rest and drink lots of fluids.

## 2022-09-06 DIAGNOSIS — I10 HYPERTENSION, UNSPECIFIED TYPE: Primary | ICD-10-CM

## 2022-09-07 RX ORDER — AMLODIPINE BESYLATE 5 MG/1
5 TABLET ORAL DAILY
Qty: 90 TABLET | Refills: 1 | Status: SHIPPED | OUTPATIENT
Start: 2022-09-07 | End: 2022-12-07 | Stop reason: SDUPTHER

## 2022-09-09 RX ORDER — METOPROLOL SUCCINATE 50 MG/1
50 TABLET, EXTENDED RELEASE ORAL DAILY
Qty: 30 TABLET | Refills: 1 | Status: SHIPPED | OUTPATIENT
Start: 2022-09-09 | End: 2022-11-11 | Stop reason: SDUPTHER

## 2022-11-11 RX ORDER — METOPROLOL SUCCINATE 50 MG/1
50 TABLET, EXTENDED RELEASE ORAL DAILY
Qty: 30 TABLET | Refills: 1 | Status: SHIPPED | OUTPATIENT
Start: 2022-11-11 | End: 2023-01-12 | Stop reason: SDUPTHER

## 2022-12-07 DIAGNOSIS — I10 HYPERTENSION, UNSPECIFIED TYPE: ICD-10-CM

## 2022-12-07 RX ORDER — AMLODIPINE BESYLATE 5 MG/1
5 TABLET ORAL DAILY
Qty: 90 TABLET | Refills: 1 | Status: SHIPPED | OUTPATIENT
Start: 2022-12-07 | End: 2023-01-12 | Stop reason: SDUPTHER

## 2023-01-11 ENCOUNTER — TELEPHONE (OUTPATIENT)
Dept: FAMILY MEDICINE | Facility: CLINIC | Age: 33
End: 2023-01-11
Payer: MEDICAID

## 2023-01-12 ENCOUNTER — OFFICE VISIT (OUTPATIENT)
Dept: FAMILY MEDICINE | Facility: CLINIC | Age: 33
End: 2023-01-12
Payer: MEDICAID

## 2023-01-12 VITALS
OXYGEN SATURATION: 99 % | BODY MASS INDEX: 17.83 KG/M2 | RESPIRATION RATE: 18 BRPM | SYSTOLIC BLOOD PRESSURE: 129 MMHG | HEART RATE: 77 BPM | DIASTOLIC BLOOD PRESSURE: 84 MMHG | HEIGHT: 71 IN | WEIGHT: 127.38 LBS | TEMPERATURE: 98 F

## 2023-01-12 DIAGNOSIS — Z00.00 ENCOUNTER FOR WELLNESS EXAMINATION: Primary | ICD-10-CM

## 2023-01-12 DIAGNOSIS — I10 HYPERTENSION, UNSPECIFIED TYPE: ICD-10-CM

## 2023-01-12 DIAGNOSIS — R51.9 NONINTRACTABLE HEADACHE, UNSPECIFIED CHRONICITY PATTERN, UNSPECIFIED HEADACHE TYPE: ICD-10-CM

## 2023-01-12 DIAGNOSIS — Z20.828 EXPOSURE TO VIRAL DISEASE: ICD-10-CM

## 2023-01-12 LAB
ALBUMIN SERPL-MCNC: 5 G/DL (ref 3.5–5)
ALBUMIN/GLOB SERPL: 1.5 RATIO (ref 1.1–2)
ALP SERPL-CCNC: 74 UNIT/L (ref 40–150)
ALT SERPL-CCNC: 19 UNIT/L (ref 0–55)
APPEARANCE UR: CLEAR
AST SERPL-CCNC: 25 UNIT/L (ref 5–34)
BACTERIA #/AREA URNS AUTO: NORMAL /HPF
BASOPHILS # BLD AUTO: 0.07 X10(3)/MCL (ref 0–0.2)
BASOPHILS NFR BLD AUTO: 0.9 %
BILIRUB UR QL STRIP.AUTO: NEGATIVE MG/DL
BILIRUBIN DIRECT+TOT PNL SERPL-MCNC: 0.2 MG/DL
BUN SERPL-MCNC: 13.5 MG/DL (ref 8.9–20.6)
CALCIUM SERPL-MCNC: 9.7 MG/DL (ref 8.4–10.2)
CHLORIDE SERPL-SCNC: 102 MMOL/L (ref 98–107)
CHOLEST SERPL-MCNC: 151 MG/DL
CHOLEST/HDLC SERPL: 3 {RATIO} (ref 0–5)
CO2 SERPL-SCNC: 28 MMOL/L (ref 22–29)
COLOR UR AUTO: NORMAL
CREAT SERPL-MCNC: 0.93 MG/DL (ref 0.73–1.18)
EOSINOPHIL # BLD AUTO: 0.15 X10(3)/MCL (ref 0–0.9)
EOSINOPHIL NFR BLD AUTO: 2 %
ERYTHROCYTE [DISTWIDTH] IN BLOOD BY AUTOMATED COUNT: 12.8 % (ref 11.5–17)
EST. AVERAGE GLUCOSE BLD GHB EST-MCNC: 105.4 MG/DL
FLUAV AG UPPER RESP QL IA.RAPID: NOT DETECTED
FLUBV AG UPPER RESP QL IA.RAPID: NOT DETECTED
GFR SERPLBLD CREATININE-BSD FMLA CKD-EPI: >60 MLS/MIN/1.73/M2
GLOBULIN SER-MCNC: 3.4 GM/DL (ref 2.4–3.5)
GLUCOSE SERPL-MCNC: 88 MG/DL (ref 74–100)
GLUCOSE UR QL STRIP.AUTO: NORMAL MG/DL
HAV IGM SERPL QL IA: NONREACTIVE
HBA1C MFR BLD: 5.3 %
HBV CORE IGM SERPL QL IA: NONREACTIVE
HBV SURFACE AG SERPL QL IA: NONREACTIVE
HCT VFR BLD AUTO: 44.9 % (ref 42–52)
HCV AB SERPL QL IA: NONREACTIVE
HDLC SERPL-MCNC: 50 MG/DL (ref 35–60)
HGB BLD-MCNC: 14.6 GM/DL (ref 14–18)
HIV 1+2 AB+HIV1 P24 AG SERPL QL IA: NONREACTIVE
HYALINE CASTS #/AREA URNS LPF: NORMAL /LPF
IMM GRANULOCYTES # BLD AUTO: 0.02 X10(3)/MCL (ref 0–0.04)
IMM GRANULOCYTES NFR BLD AUTO: 0.3 %
KETONES UR QL STRIP.AUTO: NEGATIVE MG/DL
LDLC SERPL CALC-MCNC: 87 MG/DL (ref 50–140)
LEUKOCYTE ESTERASE UR QL STRIP.AUTO: NEGATIVE UNIT/L
LYMPHOCYTES # BLD AUTO: 1.01 X10(3)/MCL (ref 0.6–4.6)
LYMPHOCYTES NFR BLD AUTO: 13.2 %
MCH RBC QN AUTO: 29.9 PG
MCHC RBC AUTO-ENTMCNC: 32.5 MG/DL (ref 33–36)
MCV RBC AUTO: 91.8 FL (ref 80–94)
MONOCYTES # BLD AUTO: 0.96 X10(3)/MCL (ref 0.1–1.3)
MONOCYTES NFR BLD AUTO: 12.5 %
NEUTROPHILS # BLD AUTO: 5.47 X10(3)/MCL (ref 2.1–9.2)
NEUTROPHILS NFR BLD AUTO: 71.1 %
NITRITE UR QL STRIP.AUTO: NEGATIVE
NRBC BLD AUTO-RTO: 0 %
PH UR STRIP.AUTO: 7 [PH]
PLATELET # BLD AUTO: 262 X10(3)/MCL (ref 130–400)
PMV BLD AUTO: 11.2 FL (ref 7.4–10.4)
POTASSIUM SERPL-SCNC: 3.4 MMOL/L (ref 3.5–5.1)
PROT SERPL-MCNC: 8.4 GM/DL (ref 6.4–8.3)
PROT UR QL STRIP.AUTO: NEGATIVE MG/DL
RBC # BLD AUTO: 4.89 X10(6)/MCL (ref 4.7–6.1)
RBC #/AREA URNS AUTO: NORMAL /HPF
RBC UR QL AUTO: NEGATIVE UNIT/L
SARS-COV-2 RNA RESP QL NAA+PROBE: NOT DETECTED
SODIUM SERPL-SCNC: 141 MMOL/L (ref 136–145)
SP GR UR STRIP.AUTO: 1.01
SQUAMOUS #/AREA URNS LPF: NORMAL /HPF
T PALLIDUM AB SER QL: NONREACTIVE
T4 FREE SERPL-MCNC: 1.07 NG/DL (ref 0.7–1.48)
TRIGL SERPL-MCNC: 71 MG/DL (ref 34–140)
TSH SERPL-ACNC: 0.71 UIU/ML (ref 0.35–4.94)
UROBILINOGEN UR STRIP-ACNC: NORMAL MG/DL
VLDLC SERPL CALC-MCNC: 14 MG/DL
WBC # SPEC AUTO: 7.7 X10(3)/MCL (ref 4.5–11.5)
WBC #/AREA URNS AUTO: NORMAL /HPF

## 2023-01-12 PROCEDURE — 36415 COLL VENOUS BLD VENIPUNCTURE: CPT

## 2023-01-12 PROCEDURE — 80053 COMPREHEN METABOLIC PANEL: CPT

## 2023-01-12 PROCEDURE — 3079F DIAST BP 80-89 MM HG: CPT | Mod: CPTII,,,

## 2023-01-12 PROCEDURE — 1159F PR MEDICATION LIST DOCUMENTED IN MEDICAL RECORD: ICD-10-PCS | Mod: CPTII,,,

## 2023-01-12 PROCEDURE — 99395 PREV VISIT EST AGE 18-39: CPT | Mod: S$PBB,,,

## 2023-01-12 PROCEDURE — 0240U COVID/FLU A&B PCR: CPT

## 2023-01-12 PROCEDURE — 87389 HIV-1 AG W/HIV-1&-2 AB AG IA: CPT

## 2023-01-12 PROCEDURE — 85025 COMPLETE CBC W/AUTO DIFF WBC: CPT

## 2023-01-12 PROCEDURE — 3079F PR MOST RECENT DIASTOLIC BLOOD PRESSURE 80-89 MM HG: ICD-10-PCS | Mod: CPTII,,,

## 2023-01-12 PROCEDURE — 99213 OFFICE O/P EST LOW 20 MIN: CPT | Mod: PBBFAC,PN

## 2023-01-12 PROCEDURE — 86780 TREPONEMA PALLIDUM: CPT

## 2023-01-12 PROCEDURE — 3074F SYST BP LT 130 MM HG: CPT | Mod: CPTII,,,

## 2023-01-12 PROCEDURE — 3008F BODY MASS INDEX DOCD: CPT | Mod: CPTII,,,

## 2023-01-12 PROCEDURE — 1159F MED LIST DOCD IN RCRD: CPT | Mod: CPTII,,,

## 2023-01-12 PROCEDURE — 84443 ASSAY THYROID STIM HORMONE: CPT

## 2023-01-12 PROCEDURE — 99395 PR PREVENTIVE VISIT,EST,18-39: ICD-10-PCS | Mod: S$PBB,,,

## 2023-01-12 PROCEDURE — 80061 LIPID PANEL: CPT

## 2023-01-12 PROCEDURE — 83036 HEMOGLOBIN GLYCOSYLATED A1C: CPT

## 2023-01-12 PROCEDURE — 81001 URINALYSIS AUTO W/SCOPE: CPT

## 2023-01-12 PROCEDURE — 84439 ASSAY OF FREE THYROXINE: CPT

## 2023-01-12 PROCEDURE — 3074F PR MOST RECENT SYSTOLIC BLOOD PRESSURE < 130 MM HG: ICD-10-PCS | Mod: CPTII,,,

## 2023-01-12 PROCEDURE — 80074 ACUTE HEPATITIS PANEL: CPT

## 2023-01-12 PROCEDURE — 3008F PR BODY MASS INDEX (BMI) DOCUMENTED: ICD-10-PCS | Mod: CPTII,,,

## 2023-01-12 RX ORDER — BUTALBITAL, ACETAMINOPHEN AND CAFFEINE 50; 325; 40 MG/1; MG/1; MG/1
1 TABLET ORAL EVERY 4 HOURS PRN
Qty: 60 TABLET | Refills: 1 | Status: SHIPPED | OUTPATIENT
Start: 2023-01-12 | End: 2023-02-11

## 2023-01-12 RX ORDER — AMLODIPINE BESYLATE 5 MG/1
5 TABLET ORAL DAILY
Qty: 90 TABLET | Refills: 1 | Status: SHIPPED | OUTPATIENT
Start: 2023-01-12 | End: 2023-07-20 | Stop reason: SDUPTHER

## 2023-01-12 RX ORDER — METOPROLOL SUCCINATE 50 MG/1
50 TABLET, EXTENDED RELEASE ORAL DAILY
Qty: 90 TABLET | Refills: 1 | Status: SHIPPED | OUTPATIENT
Start: 2023-01-12 | End: 2023-07-18 | Stop reason: SDUPTHER

## 2023-01-12 NOTE — PROGRESS NOTES
Patient Name: Lazaro Garza   : 1990  MRN: [id number]     Subjective:   Patient ID: Lazaro Garza is a 32 y.o. male.    Chief Complaint:   Chief Complaint   Patient presents with    Follow-up        HPI: 2023:   Patient presents to clinic today for wellness labs.  Patient was seen by previous PCP who started him on metoprolol for headaches as well as hypertension, patient also utilizing amlodipine 5 mg.  Patient normotensive in clinic today.  Patient also enquiring about COVID or flu test as his girlfriend is at home sick as well.  Patient has occasional cough and just overall fatigue.  Denies fever sputum production and sore throat.  No additional complaints from patient today      ROS:  Review of Systems   Constitutional:  Negative for chills, fever and weight loss.   HENT:  Negative for ear discharge, nosebleeds and tinnitus.    Eyes:  Negative for blurred vision, photophobia and pain.   Respiratory:  Negative for cough, shortness of breath, wheezing and stridor.    Cardiovascular:  Negative for chest pain, palpitations and orthopnea.   Gastrointestinal:  Negative for abdominal pain, heartburn and nausea.   Genitourinary:  Negative for dysuria, frequency, hematuria and urgency.   Musculoskeletal:  Negative for falls and myalgias.   Skin:  Negative for itching and rash.   Neurological:  Negative for dizziness, sensory change, speech change, focal weakness, seizures, weakness and headaches.   Endo/Heme/Allergies:  Negative for environmental allergies. Does not bruise/bleed easily.   Psychiatric/Behavioral:  Negative for hallucinations and suicidal ideas.     History:     Past Medical History:   Diagnosis Date    Hypertension       History reviewed. No pertinent surgical history.  History reviewed. No pertinent family history.   Social History     Tobacco Use    Smoking status: Every Day     Packs/day: 1.00     Types: Cigarettes    Smokeless tobacco: Never   Substance and Sexual Activity     "Alcohol use: Not on file    Drug use: Not on file    Sexual activity: Not on file        Allergies: Review of patient's allergies indicates:  No Known Allergies  Objective:     Vitals:    01/12/23 1234   BP: 129/84   Pulse: 77   Resp: 18   Temp: 98 °F (36.7 °C)   SpO2: 99%   Weight: 57.8 kg (127 lb 6.4 oz)   Height: 5' 11" (1.803 m)     Body mass index is 17.77 kg/m².     Physical Examination:   Physical Exam  Vitals reviewed.   Constitutional:       Appearance: Normal appearance. He is normal weight.   HENT:      Head: Normocephalic.      Right Ear: Tympanic membrane, ear canal and external ear normal.      Left Ear: Tympanic membrane, ear canal and external ear normal.      Nose: Nose normal.      Mouth/Throat:      Mouth: Mucous membranes are moist.      Pharynx: Oropharynx is clear.   Eyes:      Extraocular Movements: Extraocular movements intact.      Conjunctiva/sclera: Conjunctivae normal.      Pupils: Pupils are equal, round, and reactive to light.   Cardiovascular:      Rate and Rhythm: Normal rate and regular rhythm.      Pulses: Normal pulses.      Heart sounds: Normal heart sounds.   Pulmonary:      Effort: Pulmonary effort is normal.      Breath sounds: Normal breath sounds.   Abdominal:      General: Abdomen is flat. Bowel sounds are normal.      Palpations: Abdomen is soft.   Musculoskeletal:         General: Normal range of motion.      Cervical back: Normal range of motion and neck supple.   Skin:     General: Skin is warm and dry.   Neurological:      General: No focal deficit present.      Mental Status: He is alert and oriented to person, place, and time.   Psychiatric:         Mood and Affect: Mood normal.         Behavior: Behavior normal.       Assessment:     Problem List Items Addressed This Visit    None  Visit Diagnoses       Encounter for wellness examination    -  Primary    Relevant Orders    TSH    T4, Free    Hemoglobin A1C    SYPHILIS ANTIBODY (WITH REFLEX RPR)    Hepatitis Panel, " Acute    Lipid Panel    CBC Auto Differential    Comprehensive Metabolic Panel    HIV 1/2 Ag/Ab (4th Gen)    Chlamydia/GC, PCR    Urinalysis, Reflex to Urine Culture Urine, Clean Catch    TSH    T4, Free    Hemoglobin A1C    SYPHILIS ANTIBODY (WITH REFLEX RPR)    Hepatitis Panel, Acute    Lipid Panel    CBC Auto Differential    Comprehensive Metabolic Panel    HIV 1/2 Ag/Ab (4th Gen)    Urinalysis, Reflex to Urine Culture Urine, Clean Catch    Exposure to viral disease        covid and flu swab today, GF positive at home    Relevant Orders    COVID/FLU A&B PCR    Hypertension, unspecified type        Relevant Medications    metoprolol succinate (TOPROL-XL) 50 MG 24 hr tablet    amLODIPine (NORVASC) 5 MG tablet    Nonintractable headache, unspecified chronicity pattern, unspecified headache type        Relevant Medications    butalbital-acetaminophen-caffeine -40 mg (FIORICET, ESGIC) -40 mg per tablet            Plan:   Lazaro was seen today for follow-up.    Diagnoses and all orders for this visit:    Encounter for wellness examination  -     TSH; Future  -     T4, Free; Future  -     Hemoglobin A1C; Future  -     SYPHILIS ANTIBODY (WITH REFLEX RPR); Future  -     Hepatitis Panel, Acute; Future  -     Lipid Panel; Future  -     CBC Auto Differential; Future  -     Comprehensive Metabolic Panel; Future  -     HIV 1/2 Ag/Ab (4th Gen); Future  -     Chlamydia/GC, PCR; Future  -     Urinalysis, Reflex to Urine Culture Urine, Clean Catch; Future  -     TSH  -     T4, Free  -     Hemoglobin A1C  -     SYPHILIS ANTIBODY (WITH REFLEX RPR)  -     Hepatitis Panel, Acute  -     Lipid Panel  -     CBC Auto Differential  -     Comprehensive Metabolic Panel  -     HIV 1/2 Ag/Ab (4th Gen)  -     Urinalysis, Reflex to Urine Culture Urine, Clean Catch  -     Chlamydia/GC, PCR  -     HIV 1/2 Ag/Ab (4th Gen)  -     Comprehensive Metabolic Panel  -     CBC Auto Differential  -     Lipid Panel  -     Hepatitis Panel, Acute  -      SYPHILIS ANTIBODY (WITH REFLEX RPR)  -     Hemoglobin A1C  -     T4, Free  -     TSH  -     Urinalysis, Reflex to Urine Culture Urine, Clean Catch    Exposure to viral disease  Comments:  covid and flu swab today, GF positive at home  Orders:  -     COVID/FLU A&B PCR    Hypertension, unspecified type  -     metoprolol succinate (TOPROL-XL) 50 MG 24 hr tablet; Take 1 tablet (50 mg total) by mouth once daily.  -     amLODIPine (NORVASC) 5 MG tablet; Take 1 tablet (5 mg total) by mouth once daily.    Nonintractable headache, unspecified chronicity pattern, unspecified headache type  -     butalbital-acetaminophen-caffeine -40 mg (FIORICET, ESGIC) -40 mg per tablet; Take 1 tablet by mouth every 4 (four) hours as needed for Headaches.       Follow up in about 6 months (around 7/12/2023) for routine labs recheck.     This note was created with the assistance of Dragon voice recognition software or phone dictation. There may be transcription errors as a result of using this technology however minimal. Effort has been made to assure accuracy of transcription but any obvious errors or omissions should be clarified with the author of the document

## 2023-01-13 ENCOUNTER — PATIENT MESSAGE (OUTPATIENT)
Dept: FAMILY MEDICINE | Facility: CLINIC | Age: 33
End: 2023-01-13
Payer: MEDICAID

## 2023-01-13 DIAGNOSIS — E87.6 HYPOKALEMIA: Primary | ICD-10-CM

## 2023-01-13 LAB — PATH REV: NORMAL

## 2023-03-20 ENCOUNTER — TELEPHONE (OUTPATIENT)
Dept: FAMILY MEDICINE | Facility: CLINIC | Age: 33
End: 2023-03-20
Payer: MEDICAID

## 2023-03-21 NOTE — TELEPHONE ENCOUNTER
Pt stated that he lost his medication and was unable to get the refill covered by insurance. Pt paid out of pocket for Metoprolol.

## 2023-07-17 DIAGNOSIS — I10 HYPERTENSION, UNSPECIFIED TYPE: ICD-10-CM

## 2023-07-19 DIAGNOSIS — I10 HYPERTENSION, UNSPECIFIED TYPE: ICD-10-CM

## 2023-07-19 RX ORDER — METOPROLOL SUCCINATE 50 MG/1
50 TABLET, EXTENDED RELEASE ORAL DAILY
Qty: 30 TABLET | Refills: 0 | Status: CANCELLED | OUTPATIENT
Start: 2023-07-19 | End: 2023-08-18

## 2023-07-19 RX ORDER — METOPROLOL SUCCINATE 50 MG/1
50 TABLET, EXTENDED RELEASE ORAL DAILY
Qty: 30 TABLET | Refills: 0 | Status: SHIPPED | OUTPATIENT
Start: 2023-07-19 | End: 2023-07-20 | Stop reason: SDUPTHER

## 2023-07-20 ENCOUNTER — OFFICE VISIT (OUTPATIENT)
Dept: FAMILY MEDICINE | Facility: CLINIC | Age: 33
End: 2023-07-20
Payer: MEDICAID

## 2023-07-20 VITALS
OXYGEN SATURATION: 99 % | WEIGHT: 127.63 LBS | HEART RATE: 90 BPM | SYSTOLIC BLOOD PRESSURE: 135 MMHG | HEIGHT: 71 IN | RESPIRATION RATE: 20 BRPM | TEMPERATURE: 98 F | DIASTOLIC BLOOD PRESSURE: 84 MMHG | BODY MASS INDEX: 17.87 KG/M2

## 2023-07-20 DIAGNOSIS — Z72.0 TOBACCO ABUSE: ICD-10-CM

## 2023-07-20 DIAGNOSIS — I10 HYPERTENSION, UNSPECIFIED TYPE: ICD-10-CM

## 2023-07-20 DIAGNOSIS — Z00.00 ENCOUNTER FOR WELLNESS EXAMINATION: ICD-10-CM

## 2023-07-20 DIAGNOSIS — E87.5 HYPERKALEMIA: Primary | ICD-10-CM

## 2023-07-20 LAB
ALBUMIN SERPL-MCNC: 4.8 G/DL (ref 3.5–5)
ALBUMIN/GLOB SERPL: 1.6 RATIO (ref 1.1–2)
ALP SERPL-CCNC: 65 UNIT/L (ref 40–150)
ALT SERPL-CCNC: 12 UNIT/L (ref 0–55)
AST SERPL-CCNC: 20 UNIT/L (ref 5–34)
BASOPHILS # BLD AUTO: 0.06 X10(3)/MCL
BASOPHILS NFR BLD AUTO: 0.9 %
BILIRUBIN DIRECT+TOT PNL SERPL-MCNC: 0.3 MG/DL
BUN SERPL-MCNC: 12.8 MG/DL (ref 8.9–20.6)
CALCIUM SERPL-MCNC: 9.7 MG/DL (ref 8.4–10.2)
CHLORIDE SERPL-SCNC: 104 MMOL/L (ref 98–107)
CHOLEST SERPL-MCNC: 150 MG/DL
CHOLEST/HDLC SERPL: 3 {RATIO} (ref 0–5)
CO2 SERPL-SCNC: 27 MMOL/L (ref 22–29)
CREAT SERPL-MCNC: 1.06 MG/DL (ref 0.73–1.18)
DEPRECATED CALCIDIOL+CALCIFEROL SERPL-MC: 58.6 NG/ML (ref 30–80)
EOSINOPHIL # BLD AUTO: 0.02 X10(3)/MCL (ref 0–0.9)
EOSINOPHIL NFR BLD AUTO: 0.3 %
ERYTHROCYTE [DISTWIDTH] IN BLOOD BY AUTOMATED COUNT: 12.8 % (ref 11.5–17)
EST. AVERAGE GLUCOSE BLD GHB EST-MCNC: 102.5 MG/DL
GFR SERPLBLD CREATININE-BSD FMLA CKD-EPI: >60 MLS/MIN/1.73/M2
GLOBULIN SER-MCNC: 3 GM/DL (ref 2.4–3.5)
GLUCOSE SERPL-MCNC: 93 MG/DL (ref 74–100)
HBA1C MFR BLD: 5.2 %
HCT VFR BLD AUTO: 42.5 % (ref 42–52)
HDLC SERPL-MCNC: 48 MG/DL (ref 35–60)
HGB BLD-MCNC: 13.8 G/DL (ref 14–18)
IMM GRANULOCYTES # BLD AUTO: 0.02 X10(3)/MCL (ref 0–0.04)
IMM GRANULOCYTES NFR BLD AUTO: 0.3 %
LDLC SERPL CALC-MCNC: 92 MG/DL (ref 50–140)
LYMPHOCYTES # BLD AUTO: 2.14 X10(3)/MCL (ref 0.6–4.6)
LYMPHOCYTES NFR BLD AUTO: 32.4 %
MCH RBC QN AUTO: 30.1 PG (ref 27–31)
MCHC RBC AUTO-ENTMCNC: 32.5 G/DL (ref 33–36)
MCV RBC AUTO: 92.6 FL (ref 80–94)
MONOCYTES # BLD AUTO: 0.73 X10(3)/MCL (ref 0.1–1.3)
MONOCYTES NFR BLD AUTO: 11 %
NEUTROPHILS # BLD AUTO: 3.64 X10(3)/MCL (ref 2.1–9.2)
NEUTROPHILS NFR BLD AUTO: 55.1 %
NRBC BLD AUTO-RTO: 0 %
PLATELET # BLD AUTO: 261 X10(3)/MCL (ref 130–400)
PMV BLD AUTO: 10.6 FL (ref 7.4–10.4)
POTASSIUM SERPL-SCNC: 4.3 MMOL/L (ref 3.5–5.1)
PROT SERPL-MCNC: 7.8 GM/DL (ref 6.4–8.3)
RBC # BLD AUTO: 4.59 X10(6)/MCL (ref 4.7–6.1)
SODIUM SERPL-SCNC: 141 MMOL/L (ref 136–145)
T4 FREE SERPL-MCNC: 1.04 NG/DL (ref 0.7–1.48)
TRIGL SERPL-MCNC: 52 MG/DL (ref 34–140)
TSH SERPL-ACNC: 0.79 UIU/ML (ref 0.35–4.94)
VLDLC SERPL CALC-MCNC: 10 MG/DL
WBC # SPEC AUTO: 6.61 X10(3)/MCL (ref 4.5–11.5)

## 2023-07-20 PROCEDURE — 99213 OFFICE O/P EST LOW 20 MIN: CPT | Mod: PBBFAC,PN

## 2023-07-20 PROCEDURE — 84443 ASSAY THYROID STIM HORMONE: CPT

## 2023-07-20 PROCEDURE — 80053 COMPREHEN METABOLIC PANEL: CPT

## 2023-07-20 PROCEDURE — 80061 LIPID PANEL: CPT

## 2023-07-20 PROCEDURE — 3008F PR BODY MASS INDEX (BMI) DOCUMENTED: ICD-10-PCS | Mod: CPTII,,,

## 2023-07-20 PROCEDURE — 3044F HG A1C LEVEL LT 7.0%: CPT | Mod: CPTII,,,

## 2023-07-20 PROCEDURE — 3079F DIAST BP 80-89 MM HG: CPT | Mod: CPTII,,,

## 2023-07-20 PROCEDURE — 1159F MED LIST DOCD IN RCRD: CPT | Mod: CPTII,,,

## 2023-07-20 PROCEDURE — 99406 BEHAV CHNG SMOKING 3-10 MIN: CPT | Mod: S$PBB,,,

## 2023-07-20 PROCEDURE — 1160F PR REVIEW ALL MEDS BY PRESCRIBER/CLIN PHARMACIST DOCUMENTED: ICD-10-PCS | Mod: CPTII,,,

## 2023-07-20 PROCEDURE — 82306 VITAMIN D 25 HYDROXY: CPT

## 2023-07-20 PROCEDURE — 3075F SYST BP GE 130 - 139MM HG: CPT | Mod: CPTII,,,

## 2023-07-20 PROCEDURE — 3079F PR MOST RECENT DIASTOLIC BLOOD PRESSURE 80-89 MM HG: ICD-10-PCS | Mod: CPTII,,,

## 2023-07-20 PROCEDURE — 84439 ASSAY OF FREE THYROXINE: CPT

## 2023-07-20 PROCEDURE — 83036 HEMOGLOBIN GLYCOSYLATED A1C: CPT

## 2023-07-20 PROCEDURE — 99214 PR OFFICE/OUTPT VISIT, EST, LEVL IV, 30-39 MIN: ICD-10-PCS | Mod: S$PBB,25,,

## 2023-07-20 PROCEDURE — 3008F BODY MASS INDEX DOCD: CPT | Mod: CPTII,,,

## 2023-07-20 PROCEDURE — 99214 OFFICE O/P EST MOD 30 MIN: CPT | Mod: S$PBB,25,,

## 2023-07-20 PROCEDURE — 1159F PR MEDICATION LIST DOCUMENTED IN MEDICAL RECORD: ICD-10-PCS | Mod: CPTII,,,

## 2023-07-20 PROCEDURE — 99406 PR TOBACCO USE CESSATION INTERMEDIATE 3-10 MINUTES: ICD-10-PCS | Mod: S$PBB,,,

## 2023-07-20 PROCEDURE — 3044F PR MOST RECENT HEMOGLOBIN A1C LEVEL <7.0%: ICD-10-PCS | Mod: CPTII,,,

## 2023-07-20 PROCEDURE — 3075F PR MOST RECENT SYSTOLIC BLOOD PRESS GE 130-139MM HG: ICD-10-PCS | Mod: CPTII,,,

## 2023-07-20 PROCEDURE — 1160F RVW MEDS BY RX/DR IN RCRD: CPT | Mod: CPTII,,,

## 2023-07-20 PROCEDURE — 36415 COLL VENOUS BLD VENIPUNCTURE: CPT

## 2023-07-20 PROCEDURE — 85025 COMPLETE CBC W/AUTO DIFF WBC: CPT

## 2023-07-20 RX ORDER — AMLODIPINE BESYLATE 5 MG/1
5 TABLET ORAL DAILY
Qty: 90 TABLET | Refills: 3 | Status: SHIPPED | OUTPATIENT
Start: 2023-07-20 | End: 2024-07-19

## 2023-07-20 RX ORDER — METOPROLOL SUCCINATE 50 MG/1
50 TABLET, EXTENDED RELEASE ORAL DAILY
Qty: 90 TABLET | Refills: 3 | Status: SHIPPED | OUTPATIENT
Start: 2023-07-20 | End: 2024-07-19

## 2023-07-20 NOTE — ASSESSMENT & PLAN NOTE
Low Sodium Diet (Dash Diet - less than 2 grams of sodium per day).  Monitor Blood Pressure daily and log. Report any consistent numbers greater than 140/90.  Smoking Cessation encouraged to aid in BP reduction.  Maintain healthy weight with goal BMI <30. Exercise 30 minutes per day 5 days per week    Stable and well controlled. Continue current medication. Limit salt in diet. Monitor BP and notify clinic for consistently elevated BP >140/90.

## 2023-07-21 ENCOUNTER — PATIENT MESSAGE (OUTPATIENT)
Dept: FAMILY MEDICINE | Facility: CLINIC | Age: 33
End: 2023-07-21
Payer: MEDICAID

## 2024-07-10 ENCOUNTER — OFFICE VISIT (OUTPATIENT)
Dept: FAMILY MEDICINE | Facility: CLINIC | Age: 34
End: 2024-07-10

## 2024-07-10 VITALS
HEART RATE: 80 BPM | TEMPERATURE: 98 F | OXYGEN SATURATION: 99 % | BODY MASS INDEX: 17.82 KG/M2 | HEIGHT: 71 IN | SYSTOLIC BLOOD PRESSURE: 139 MMHG | DIASTOLIC BLOOD PRESSURE: 79 MMHG | WEIGHT: 127.31 LBS | RESPIRATION RATE: 20 BRPM

## 2024-07-10 DIAGNOSIS — I10 HYPERTENSION, UNSPECIFIED TYPE: ICD-10-CM

## 2024-07-10 DIAGNOSIS — B00.9 HSV INFECTION: Primary | ICD-10-CM

## 2024-07-10 DIAGNOSIS — Z00.00 ENCOUNTER FOR WELLNESS EXAMINATION: ICD-10-CM

## 2024-07-10 PROCEDURE — 99214 OFFICE O/P EST MOD 30 MIN: CPT | Mod: S$PBB,,,

## 2024-07-10 PROCEDURE — 99213 OFFICE O/P EST LOW 20 MIN: CPT | Mod: PBBFAC,PN

## 2024-07-10 RX ORDER — VALACYCLOVIR HYDROCHLORIDE 500 MG/1
TABLET, FILM COATED ORAL
Qty: 6 TABLET | Refills: 5 | Status: SHIPPED | OUTPATIENT
Start: 2024-07-10 | End: 2025-07-02

## 2024-07-10 RX ORDER — METOPROLOL SUCCINATE 50 MG/1
50 TABLET, EXTENDED RELEASE ORAL DAILY
Qty: 90 TABLET | Refills: 3 | Status: SHIPPED | OUTPATIENT
Start: 2024-07-10 | End: 2025-07-10

## 2024-07-10 RX ORDER — AMLODIPINE BESYLATE 5 MG/1
5 TABLET ORAL DAILY
Qty: 90 TABLET | Refills: 3 | Status: SHIPPED | OUTPATIENT
Start: 2024-07-10 | End: 2025-07-10

## 2024-07-10 NOTE — ASSESSMENT & PLAN NOTE
Low Sodium Diet (Dash Diet - less than 2 grams of sodium per day).  Monitor Blood Pressure daily and log. Report any consistent numbers greater than 140/90.  Smoking Cessation encouraged to aid in BP reduction.  Maintain healthy weight with goal BMI <30. Exercise 30 minutes per day 5 days per week    Stable and well controlled. Continue current medication of amlodipine 5 mg daily metoprolol succinate 50 mg daily. Limit salt in diet. Monitor BP and notify clinic for consistently elevated BP >140/90.

## 2024-07-10 NOTE — PROGRESS NOTES
"Patient Name: Lazaro Garza     : 1990    MRN: [id number]     Subjective:     Patient ID: Lazaro Garza is a 34 y.o. male.    Chief Complaint:   Chief Complaint   Patient presents with    Follow-up     One year f/u appointment. C/o cold sores during winter time and when he doesn't take vitamins.         HPI: 07/10/2024:  Patient presents to clinic today for annual wellness exam, patient has had no new medical complaints in the past year.  Patient states that his wife is pregnant they are expecting their 1st child together in January.  Doing well overall. Patient denies chest pain, palpitations, and shortness of breath.  Patient denies fever, night sweats, chills, nausea, vomiting, diarrhea, constipation, weight loss, and changes in appetite.            ROS:       12 point review of systems conducted, negative except as stated in the history of present illness. See HPI for details.    History:     Past Medical History:   Diagnosis Date    Hypertension         History reviewed. No pertinent surgical history.    No family history on file.     Social History     Tobacco Use    Smoking status: Every Day     Current packs/day: 1.00     Types: Cigarettes    Smokeless tobacco: Never   Substance and Sexual Activity    Alcohol use: Not Currently     Comment: occ 2-3 beers    Drug use: Yes     Types: Marijuana    Sexual activity: Yes     Partners: Female       Current Outpatient Medications   Medication Instructions    amLODIPine (NORVASC) 5 mg, Oral, Daily    metoprolol succinate (TOPROL-XL) 50 mg, Oral, Daily    valACYclovir (VALTREX) 500 MG tablet Take one 500 mg tablet by mouth 2 times daily for 3 days.        Review of patient's allergies indicates:  No Known Allergies    Objective:     Visit Vitals  /79 (BP Location: Right arm, Patient Position: Sitting)   Pulse 80   Temp 98.1 °F (36.7 °C) (Oral)   Resp 20   Ht 5' 11" (1.803 m)   Wt 57.7 kg (127 lb 4.8 oz)   SpO2 99%   BMI 17.75 kg/m²       Physical " Examination:     Physical Exam  Constitutional:       General: He is not in acute distress.     Appearance: Normal appearance. He is not ill-appearing.   Cardiovascular:      Rate and Rhythm: Normal rate and regular rhythm.      Heart sounds: Normal heart sounds.   Pulmonary:      Effort: Pulmonary effort is normal. No respiratory distress.      Breath sounds: Normal breath sounds.   Musculoskeletal:      Cervical back: Normal range of motion.   Skin:     General: Skin is warm and dry.   Neurological:      Mental Status: He is alert and oriented to person, place, and time.   Psychiatric:         Mood and Affect: Mood normal.         Behavior: Behavior normal.         Lab Results:     Chemistry:  Lab Results   Component Value Date     07/20/2023    K 4.3 07/20/2023    BUN 12.8 07/20/2023    CREATININE 1.06 07/20/2023    EGFRNORACEVR >60 07/20/2023    GLUCOSE 93 07/20/2023    CALCIUM 9.7 07/20/2023    ALKPHOS 65 07/20/2023    LABPROT 7.8 07/20/2023    ALBUMIN 4.8 07/20/2023    BILIDIR 0.2 08/05/2021    IBILI 0.20 08/05/2021    AST 20 07/20/2023    ALT 12 07/20/2023    IYJFBQMZ90ME 58.6 07/20/2023    TSH 0.791 07/20/2023    BAWRQX5QACE 1.04 07/20/2023        Lab Results   Component Value Date    HGBA1C 5.2 07/20/2023        Hematology:  Lab Results   Component Value Date    WBC 6.61 07/20/2023    HGB 13.8 (L) 07/20/2023    HCT 42.5 07/20/2023     07/20/2023       Lipid Panel:  Lab Results   Component Value Date    CHOL 150 07/20/2023    HDL 48 07/20/2023    LDL 92.00 07/20/2023    TRIG 52 07/20/2023    TOTALCHOLEST 3 07/20/2023        Urine:  Lab Results   Component Value Date    APPEARANCEUA Clear 01/12/2023    SGUA 1.010 01/12/2023    PROTEINUA Negative 01/12/2023    KETONESUA Negative 01/12/2023    LEUKOCYTESUR Negative 01/12/2023    RBCUA 0-5 01/12/2023    WBCUA 0-5 01/12/2023    BACTERIA None Seen 01/12/2023    SQEPUA None Seen 01/12/2023    HYALINECASTS None Seen 01/12/2023        Assessment:           ICD-10-CM ICD-9-CM   1. HSV infection  B00.9 054.9   2. Hypertension, unspecified type  I10 401.9   3. Encounter for wellness examination  Z00.00 V70.0        Plan:     1. HSV infection  Comments:  valtrex 500 mg bid for 3 days during break out. discussed taking as soon as possible for best results.  Orders:  -     valACYclovir (VALTREX) 500 MG tablet; Take one 500 mg tablet by mouth 2 times daily for 3 days.  Dispense: 6 tablet; Refill: 5    2. Hypertension, unspecified type  Assessment & Plan:  Low Sodium Diet (Dash Diet - less than 2 grams of sodium per day).  Monitor Blood Pressure daily and log. Report any consistent numbers greater than 140/90.  Smoking Cessation encouraged to aid in BP reduction.  Maintain healthy weight with goal BMI <30. Exercise 30 minutes per day 5 days per week    Stable and well controlled. Continue current medication of amlodipine 5 mg daily metoprolol succinate 50 mg daily. Limit salt in diet. Monitor BP and notify clinic for consistently elevated BP >140/90.      Orders:  -     amLODIPine (NORVASC) 5 MG tablet; Take 1 tablet (5 mg total) by mouth once daily.  Dispense: 90 tablet; Refill: 3  -     metoprolol succinate (TOPROL-XL) 50 MG 24 hr tablet; Take 1 tablet (50 mg total) by mouth once daily.  Dispense: 90 tablet; Refill: 3    3. Encounter for wellness examination  -     TSH; Future; Expected date: 07/10/2024  -     T4, Free; Future; Expected date: 07/10/2024  -     Hemoglobin A1C; Future; Expected date: 07/10/2024  -     Hepatitis Panel, Acute; Future; Expected date: 07/10/2024  -     Lipid Panel; Future; Expected date: 07/10/2024  -     CBC Auto Differential; Future; Expected date: 07/10/2024  -     Comprehensive Metabolic Panel; Future; Expected date: 07/10/2024  -     Urinalysis, Reflex to Urine Culture; Future; Expected date: 07/10/2024         Follow up in about 1 year (around 7/10/2025).    Future Appointments   Date Time Provider Department Center   7/14/2025 12:45 PM  Maria Alejandra Berg NP [facility]        Maria Alejandra Berg NP

## 2025-05-23 ENCOUNTER — OFFICE VISIT (OUTPATIENT)
Dept: FAMILY MEDICINE | Facility: CLINIC | Age: 35
End: 2025-05-23

## 2025-05-23 VITALS
OXYGEN SATURATION: 99 % | DIASTOLIC BLOOD PRESSURE: 79 MMHG | RESPIRATION RATE: 18 BRPM | WEIGHT: 130.69 LBS | SYSTOLIC BLOOD PRESSURE: 131 MMHG | TEMPERATURE: 98 F | HEIGHT: 71 IN | BODY MASS INDEX: 18.3 KG/M2 | HEART RATE: 73 BPM

## 2025-05-23 DIAGNOSIS — I10 PRIMARY HYPERTENSION: ICD-10-CM

## 2025-05-23 DIAGNOSIS — Z72.0 TOBACCO ABUSE: ICD-10-CM

## 2025-05-23 DIAGNOSIS — Z00.00 WELL ADULT EXAM: Primary | ICD-10-CM

## 2025-05-23 DIAGNOSIS — R10.11 RIGHT UPPER QUADRANT ABDOMINAL PAIN: ICD-10-CM

## 2025-05-23 LAB
25(OH)D3+25(OH)D2 SERPL-MCNC: 40 NG/ML (ref 30–80)
ALBUMIN SERPL-MCNC: 4.3 G/DL (ref 3.5–5)
ALBUMIN/GLOB SERPL: 1.3 RATIO (ref 1.1–2)
ALP SERPL-CCNC: 71 UNIT/L (ref 40–150)
ALT SERPL-CCNC: 9 UNIT/L (ref 0–55)
AMORPH URATE CRY URNS QL MICRO: ABNORMAL /UL
ANION GAP SERPL CALC-SCNC: 6 MEQ/L
AST SERPL-CCNC: 15 UNIT/L (ref 11–45)
BACTERIA #/AREA URNS AUTO: ABNORMAL /HPF
BASOPHILS # BLD AUTO: 0.1 X10(3)/MCL
BASOPHILS NFR BLD AUTO: 1.3 %
BILIRUB SERPL-MCNC: 0.5 MG/DL
BILIRUB UR QL STRIP.AUTO: NEGATIVE
BUN SERPL-MCNC: 15.2 MG/DL (ref 8.9–20.6)
CALCIUM SERPL-MCNC: 9.8 MG/DL (ref 8.4–10.2)
CHLORIDE SERPL-SCNC: 107 MMOL/L (ref 98–107)
CHOLEST SERPL-MCNC: 168 MG/DL
CHOLEST/HDLC SERPL: 4 {RATIO} (ref 0–5)
CLARITY UR: ABNORMAL
CO2 SERPL-SCNC: 28 MMOL/L (ref 22–29)
COLOR UR AUTO: YELLOW
CREAT SERPL-MCNC: 0.83 MG/DL (ref 0.72–1.25)
CREAT/UREA NIT SERPL: 18
EOSINOPHIL # BLD AUTO: 0.15 X10(3)/MCL (ref 0–0.9)
EOSINOPHIL NFR BLD AUTO: 1.9 %
ERYTHROCYTE [DISTWIDTH] IN BLOOD BY AUTOMATED COUNT: 12.4 % (ref 11.5–17)
EST. AVERAGE GLUCOSE BLD GHB EST-MCNC: 108.3 MG/DL
GFR SERPLBLD CREATININE-BSD FMLA CKD-EPI: >60 ML/MIN/1.73/M2
GLOBULIN SER-MCNC: 3.4 GM/DL (ref 2.4–3.5)
GLUCOSE SERPL-MCNC: 98 MG/DL (ref 74–100)
GLUCOSE UR QL STRIP: NORMAL
HBA1C MFR BLD: 5.4 %
HCT VFR BLD AUTO: 45.6 % (ref 42–52)
HDLC SERPL-MCNC: 42 MG/DL (ref 35–60)
HGB BLD-MCNC: 15.4 G/DL (ref 14–18)
HGB UR QL STRIP: NEGATIVE
HYALINE CASTS #/AREA URNS LPF: ABNORMAL /LPF
IMM GRANULOCYTES # BLD AUTO: 0.01 X10(3)/MCL (ref 0–0.04)
IMM GRANULOCYTES NFR BLD AUTO: 0.1 %
KETONES UR QL STRIP: NEGATIVE
LDLC SERPL CALC-MCNC: 113 MG/DL (ref 50–140)
LEUKOCYTE ESTERASE UR QL STRIP: NEGATIVE
LYMPHOCYTES # BLD AUTO: 2.25 X10(3)/MCL (ref 0.6–4.6)
LYMPHOCYTES NFR BLD AUTO: 28.7 %
MCH RBC QN AUTO: 30.7 PG (ref 27–31)
MCHC RBC AUTO-ENTMCNC: 33.8 G/DL (ref 33–36)
MCV RBC AUTO: 91 FL (ref 80–94)
MONOCYTES # BLD AUTO: 0.96 X10(3)/MCL (ref 0.1–1.3)
MONOCYTES NFR BLD AUTO: 12.2 %
MUCOUS THREADS URNS QL MICRO: ABNORMAL /LPF
NEUTROPHILS # BLD AUTO: 4.37 X10(3)/MCL (ref 2.1–9.2)
NEUTROPHILS NFR BLD AUTO: 55.8 %
NITRITE UR QL STRIP: NEGATIVE
NRBC BLD AUTO-RTO: 0 %
PH UR STRIP: 7 [PH]
PLATELET # BLD AUTO: 285 X10(3)/MCL (ref 130–400)
PMV BLD AUTO: 10.5 FL (ref 7.4–10.4)
POTASSIUM SERPL-SCNC: 4.8 MMOL/L (ref 3.5–5.1)
PROT SERPL-MCNC: 7.7 GM/DL (ref 6.4–8.3)
PROT UR QL STRIP: ABNORMAL
RBC # BLD AUTO: 5.01 X10(6)/MCL (ref 4.7–6.1)
RBC #/AREA URNS AUTO: ABNORMAL /HPF
SODIUM SERPL-SCNC: 141 MMOL/L (ref 136–145)
SP GR UR STRIP.AUTO: 1.02 (ref 1–1.03)
SQUAMOUS #/AREA URNS LPF: ABNORMAL /HPF
TRIGL SERPL-MCNC: 66 MG/DL (ref 34–140)
TSH SERPL-ACNC: 1.01 UIU/ML (ref 0.35–4.94)
UROBILINOGEN UR STRIP-ACNC: NORMAL
VLDLC SERPL CALC-MCNC: 13 MG/DL
WBC # BLD AUTO: 7.84 X10(3)/MCL (ref 4.5–11.5)
WBC #/AREA URNS AUTO: ABNORMAL /HPF

## 2025-05-23 PROCEDURE — 99214 OFFICE O/P EST MOD 30 MIN: CPT | Mod: PBBFAC,PN | Performed by: NURSE PRACTITIONER

## 2025-05-23 PROCEDURE — 80061 LIPID PANEL: CPT | Performed by: NURSE PRACTITIONER

## 2025-05-23 PROCEDURE — 81001 URINALYSIS AUTO W/SCOPE: CPT | Performed by: NURSE PRACTITIONER

## 2025-05-23 PROCEDURE — 85025 COMPLETE CBC W/AUTO DIFF WBC: CPT | Performed by: NURSE PRACTITIONER

## 2025-05-23 PROCEDURE — 82306 VITAMIN D 25 HYDROXY: CPT | Performed by: NURSE PRACTITIONER

## 2025-05-23 PROCEDURE — 99395 PREV VISIT EST AGE 18-39: CPT | Mod: S$PBB,,, | Performed by: NURSE PRACTITIONER

## 2025-05-23 PROCEDURE — 80053 COMPREHEN METABOLIC PANEL: CPT | Performed by: NURSE PRACTITIONER

## 2025-05-23 PROCEDURE — 84443 ASSAY THYROID STIM HORMONE: CPT | Performed by: NURSE PRACTITIONER

## 2025-05-23 PROCEDURE — 83036 HEMOGLOBIN GLYCOSYLATED A1C: CPT | Performed by: NURSE PRACTITIONER

## 2025-05-23 RX ORDER — METOPROLOL SUCCINATE 50 MG/1
50 TABLET, EXTENDED RELEASE ORAL DAILY
Qty: 90 TABLET | Refills: 3 | Status: SHIPPED | OUTPATIENT
Start: 2025-05-23

## 2025-05-23 RX ORDER — AMLODIPINE BESYLATE 5 MG/1
5 TABLET ORAL DAILY
Qty: 90 TABLET | Refills: 3 | Status: SHIPPED | OUTPATIENT
Start: 2025-05-23

## 2025-05-23 NOTE — PROGRESS NOTES
[facility]    Patient ID: [id number]     Chief Complaint: Follow-up (3 wks c/o loose bowel movements; stomach pain on his right side; states that the pain comes and goes)      HPI:   HPI    Lazaro Graza is a 34 y.o. male here today for Follow-up (3 wks c/o loose bowel movements; stomach pain on his right side; states that the pain comes and goes)    History of Present Illness    05/24/2025: Patient presents to clinic today to establish care with new provider in clinic and wellness visit. Current medical problems include HTN and tobacco use.    Reporting today loose stools, stomach discomfort, and right-sided abdominal pain under the ribs. Patient reports loose stools, with 3 bowel movements yesterday and 1 this morning. The consistency alternates between soft and watery, with some mucus-like appearance. He describes the stools as extremely watery at times, with formed pieces that appear mucoid. These episodes can occur immediately after eating or early in the morning upon waking.    He also complains of stomach discomfort, not always associated with bowel movements. There is a sensation of pressure underneath the ribs on the right side, particularly noticeable when coughing or turning in certain ways. The pain is localized to an area just under the right rib cage, specifically at the junction of the last rib.    Patient had 1 episode of nausea about a month ago, waking up in the middle of the night feeling nauseated. This appears to have been an isolated incident.    The abdominal discomfort is intermittent and is not consistently alleviated by having a bowel movement.    Regarding diet, he admits to consuming a varied diet and frequently eating out for lunch while at work.    Patient denies chest pain, shortness of breath, current nausea, burning with urination, abnormal-looking urine, or swelling.    This note was generated with the assistance of ambient listening technology. Verbal consent was  obtained by the patient and accompanying visitor(s) for the recording of patient appointment to facilitate this note. I attest to having reviewed and edited the generated note for accuracy, though some syntax or spelling errors may persist. Please contact the author of this note for any clarification.               Health Maintenance         Date Due Completion Date    TETANUS VACCINE Never done ---    Pneumococcal Vaccines (Age 0-49) (1 of 2 - PCV) Never done ---    COVID-19 Vaccine (1 - 2024-25 season) Never done ---    Influenza Vaccine (Season Ended) 09/01/2025 9/30/2019    RSV Vaccine (Age 60+ and Pregnant patients) (1 - 1-dose 75+ series) 06/09/2067 ---            Past Medical History:   Diagnosis Date    Hypertension         History reviewed. No pertinent surgical history.     Social History     Tobacco Use    Smoking status: Every Day     Current packs/day: 1.00     Types: Cigarettes    Smokeless tobacco: Never   Substance and Sexual Activity    Alcohol use: Not Currently     Comment: occ 2-3 beers    Drug use: Yes     Types: Marijuana    Sexual activity: Yes     Partners: Female        Current Outpatient Medications   Medication Instructions    amLODIPine (NORVASC) 5 mg, Oral, Daily    metoprolol succinate (TOPROL-XL) 50 mg, Oral, Daily    valACYclovir (VALTREX) 500 MG tablet Take one 500 mg tablet by mouth 2 times daily for 3 days.       Review of patient's allergies indicates:  No Known Allergies     Patient Care Team:  Maria Alejandra Berg FNP as PCP - General (Family Medicine)     Subjective:     Review of Systems   Constitutional:  Negative for appetite change, chills, diaphoresis and fever.   HENT:  Negative for ear pain, sinus pain and sore throat.    Eyes:  Negative for pain and visual disturbance.   Respiratory:  Negative for cough, shortness of breath and wheezing.    Cardiovascular:  Negative for chest pain, palpitations and leg swelling.   Gastrointestinal:  Positive for abdominal pain  "(RQ/flank) and diarrhea (soft to loose, not associated with pain). Negative for blood in stool, nausea and vomiting.   Endocrine: Negative for cold intolerance.   Genitourinary:  Negative for difficulty urinating, dysuria, frequency and hematuria.   Musculoskeletal:  Negative for arthralgias, joint swelling and myalgias.   Skin:  Negative for color change and rash.   Allergic/Immunologic: Negative.    Neurological: Negative.  Negative for dizziness, syncope, light-headedness and numbness.   Hematological: Negative.    Psychiatric/Behavioral: Negative.  Negative for dysphoric mood and suicidal ideas. The patient is not nervous/anxious.    All other systems reviewed and are negative.      12 point review of systems conducted, negative except as stated in the history of present illness. See HPI for details.    Objective:     Visit Vitals  /79 (BP Location: Right arm, Patient Position: Sitting)   Pulse 73   Temp 98.2 °F (36.8 °C) (Oral)   Resp 18   Ht 5' 11" (1.803 m)   Wt 59.3 kg (130 lb 11.2 oz)   SpO2 99%   BMI 18.23 kg/m²       Physical Exam  Vitals and nursing note reviewed.   Constitutional:       General: He is not in acute distress.     Appearance: He is not ill-appearing.   HENT:      Head: Normocephalic and atraumatic.      Mouth/Throat:      Mouth: Mucous membranes are moist.      Pharynx: Oropharynx is clear.   Eyes:      General: No scleral icterus.     Extraocular Movements: Extraocular movements intact.      Conjunctiva/sclera: Conjunctivae normal.      Pupils: Pupils are equal, round, and reactive to light.   Neck:      Vascular: No carotid bruit.   Cardiovascular:      Rate and Rhythm: Normal rate and regular rhythm.      Heart sounds: No murmur heard.     No friction rub. No gallop.   Pulmonary:      Effort: Pulmonary effort is normal. No respiratory distress.      Breath sounds: Normal breath sounds. No wheezing, rhonchi or rales.   Abdominal:      General: Abdomen is flat. Bowel sounds are " normal. There is no distension.      Palpations: Abdomen is soft. There is fluid wave. There is no mass.      Tenderness: There is no abdominal tenderness.      Comments: No tenderness   Musculoskeletal:         General: Normal range of motion.      Cervical back: Normal range of motion and neck supple.   Skin:     General: Skin is warm and dry.   Neurological:      General: No focal deficit present.      Mental Status: He is alert.   Psychiatric:         Mood and Affect: Mood normal.         Labs Reviewed:     Chemistry:  Lab Results   Component Value Date     07/20/2023    K 4.3 07/20/2023    BUN 12.8 07/20/2023    CREATININE 1.06 07/20/2023    EGFRNORACEVR >60 07/20/2023    CALCIUM 9.7 07/20/2023    ALKPHOS 65 07/20/2023    ALBUMIN 4.8 07/20/2023    BILIDIR 0.2 08/05/2021    IBILI 0.20 08/05/2021    AST 20 07/20/2023    ALT 12 07/20/2023    VYHKSUXZ19WS 58.6 07/20/2023    TSH 0.791 07/20/2023    ADUEOC1WMYU 1.04 07/20/2023        Lab Results   Component Value Date    HGBA1C 5.2 07/20/2023        Hematology:  Lab Results   Component Value Date    WBC 6.61 07/20/2023    HGB 13.8 (L) 07/20/2023    HCT 42.5 07/20/2023     07/20/2023       Lipid Panel:  Lab Results   Component Value Date    CHOL 150 07/20/2023    HDL 48 07/20/2023    LDL 92.00 07/20/2023    TRIG 52 07/20/2023    TOTALCHOLEST 3 07/20/2023        Urine:  Lab Results   Component Value Date    APPEARANCEUA Clear 01/12/2023    SGUA 1.010 01/12/2023    PROTEINUA Negative 01/12/2023    KETONESUA Negative 01/12/2023    LEUKOCYTESUR Negative 01/12/2023    RBCUA 0-5 01/12/2023    WBCUA 0-5 01/12/2023    BACTERIA None Seen 01/12/2023    SQEPUA None Seen 01/12/2023    HYALINECASTS None Seen 01/12/2023        Assessment:       ICD-10-CM ICD-9-CM   1. Well adult exam  Z00.00 V70.0   2. Primary hypertension  I10 401.9   3. Tobacco abuse  Z72.0 305.1   4. Right upper quadrant abdominal pain  R10.11 789.01        Plan:     1. Well adult exam  Assessment &  Plan:  - Pt wellness visit completed today with appropriate lab work.   -  Topics Reviewed / Updated  - Immunizations Discussed  - Dicussed Healthy Diet  - Encouraged to exercise 3 x weekly  - Increase Water Intake  - Eat more fruits and vegetables  - Avoid soda & alcohol  - PHQ score: 0, screening negative, 5 mins spent interpreting the results and discussion with patient    Orders:  -     CBC Auto Differential  -     Comprehensive Metabolic Panel  -     Lipid Panel  -     TSH  -     Hemoglobin A1C  -     Urinalysis  -     Vitamin D    2. Primary hypertension  Assessment & Plan:  - Disease state: Uncontrolled  - BP: 131/79  - Continue  Hypertension Medications              amLODIPine (NORVASC) 5 MG tablet Take 1 tablet (5 mg total) by mouth once daily.    metoprolol succinate (TOPROL-XL) 50 MG 24 hr tablet Take 1 tablet (50 mg total) by mouth once daily.        - Low Sodium Diet (Dash Diet - less than 2 grams of sodium per day).  - Maintain healthy weight with goal BMI <30. Exercise 30 minutes per day 5 days per week.  - Patient encouraged to monitor BP every day 2-3 hours after medications and record.  - Report to PCP if BP > 140/90 x3 days  - Instructed to hold any medications that will lower BP if SBP <120 prior to medications. Report to PCP if has to hold BP medication >2 doses in a row.    Orders:  -     amLODIPine (NORVASC) 5 MG tablet; Take 1 tablet (5 mg total) by mouth once daily.  Dispense: 90 tablet; Refill: 3  -     metoprolol succinate (TOPROL-XL) 50 MG 24 hr tablet; Take 1 tablet (50 mg total) by mouth once daily.  Dispense: 90 tablet; Refill: 3  -     CBC Auto Differential  -     Comprehensive Metabolic Panel  -     Lipid Panel  -     TSH  -     Hemoglobin A1C  -     Urinalysis  -     Vitamin D    3. Tobacco abuse  Assessment & Plan:  - Smoking cessation discussed > 3 mins.  - Pt not ready to quit.  - Discussed benefits of quitting including improved health, decreased  cardiac/vascular/pulmonary/stroke risks as well as saving money.      4. Right upper quadrant abdominal pain  Assessment & Plan:  - diarrhea not always associated with pain, pain not relieved with diarrhea  - decrease fatty intake, keep food diary to determine if pain increases with foods  - US ordered  - ED precautions given    Orders:  -     CBC Auto Differential  -     US Abdomen Limited; Future; Expected date: 05/23/2025         Follow up in about 2 weeks (around 6/6/2025) for abd pain/diarrhea, Follow up. In addition to their scheduled follow up, the patient has also been instructed to follow up on as needed basis.     Future Appointments   Date Time Provider Department Center   6/12/2025  8:20 AM Nela Jacinto FNP [facility]           [facility]

## 2025-05-24 NOTE — ASSESSMENT & PLAN NOTE
- diarrhea not always associated with pain, pain not relieved with diarrhea  - decrease fatty intake, keep food diary to determine if pain increases with foods  - US ordered  - ED precautions given

## 2025-05-24 NOTE — ASSESSMENT & PLAN NOTE
- Disease state: Uncontrolled  - BP: 131/79  - Continue  Hypertension Medications              amLODIPine (NORVASC) 5 MG tablet Take 1 tablet (5 mg total) by mouth once daily.    metoprolol succinate (TOPROL-XL) 50 MG 24 hr tablet Take 1 tablet (50 mg total) by mouth once daily.        - Low Sodium Diet (Dash Diet - less than 2 grams of sodium per day).  - Maintain healthy weight with goal BMI <30. Exercise 30 minutes per day 5 days per week.  - Patient encouraged to monitor BP every day 2-3 hours after medications and record.  - Report to PCP if BP > 140/90 x3 days  - Instructed to hold any medications that will lower BP if SBP <120 prior to medications. Report to PCP if has to hold BP medication >2 doses in a row.

## 2025-05-24 NOTE — ASSESSMENT & PLAN NOTE
- Pt wellness visit completed today with appropriate lab work.   - HM Topics Reviewed / Updated  - Immunizations Discussed  - Dicussed Healthy Diet  - Encouraged to exercise 3 x weekly  - Increase Water Intake  - Eat more fruits and vegetables  - Avoid soda & alcohol  - PHQ score: 0, screening negative, 5 mins spent interpreting the results and discussion with patient

## 2025-05-24 NOTE — ASSESSMENT & PLAN NOTE
- Smoking cessation discussed > 3 mins.  - Pt not ready to quit.  - Discussed benefits of quitting including improved health, decreased cardiac/vascular/pulmonary/stroke risks as well as saving money.

## 2025-05-26 ENCOUNTER — RESULTS FOLLOW-UP (OUTPATIENT)
Dept: FAMILY MEDICINE | Facility: CLINIC | Age: 35
End: 2025-05-26

## 2025-06-02 ENCOUNTER — HOSPITAL ENCOUNTER (OUTPATIENT)
Dept: RADIOLOGY | Facility: HOSPITAL | Age: 35
Discharge: HOME | End: 2025-06-02
Attending: NURSE PRACTITIONER

## 2025-06-02 DIAGNOSIS — R10.11 RIGHT UPPER QUADRANT ABDOMINAL PAIN: ICD-10-CM

## 2025-06-02 PROCEDURE — 76705 ECHO EXAM OF ABDOMEN: CPT | Mod: TC

## 2025-06-03 ENCOUNTER — TELEPHONE (OUTPATIENT)
Dept: FAMILY MEDICINE | Facility: CLINIC | Age: 35
End: 2025-06-03

## 2025-06-12 ENCOUNTER — TELEPHONE (OUTPATIENT)
Dept: FAMILY MEDICINE | Facility: CLINIC | Age: 35
End: 2025-06-12

## 2025-06-12 NOTE — TELEPHONE ENCOUNTER
----- Message from Marcelo sent at 6/12/2025  9:09 AM CDT -----  Patient arrived for his appt at 8:33 his appt was for 8:20 , patient was told that we will see him if we have AM no show since he was late the provider sherif khanna would still see him if we have a no show at 9:40 , he said okay and sat down. Patient had got back up 10 mins later and started to be very rude saying that we are very unprofessional because we did not know if we would have a no show and started to raise his voice at us  , this is not the first time he has done this!